# Patient Record
Sex: FEMALE | Race: WHITE | Employment: FULL TIME | URBAN - METROPOLITAN AREA
[De-identification: names, ages, dates, MRNs, and addresses within clinical notes are randomized per-mention and may not be internally consistent; named-entity substitution may affect disease eponyms.]

---
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
## 2018-02-23 PROBLEM — Z37.9 NORMAL LABOR: Status: ACTIVE | Noted: 2018-02-23
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
## 2019-05-01 ENCOUNTER — OFFICE VISIT (OUTPATIENT)
Dept: FAMILY MEDICINE CLINIC | Age: 34
End: 2019-05-01
Payer: COMMERCIAL

## 2019-05-01 VITALS
WEIGHT: 176.8 LBS | DIASTOLIC BLOOD PRESSURE: 80 MMHG | HEIGHT: 64 IN | HEART RATE: 99 BPM | BODY MASS INDEX: 30.19 KG/M2 | OXYGEN SATURATION: 98 % | SYSTOLIC BLOOD PRESSURE: 120 MMHG

## 2019-05-01 DIAGNOSIS — J06.9 VIRAL URI: ICD-10-CM

## 2019-05-01 DIAGNOSIS — N60.11 FIBROCYSTIC BREAST CHANGES, RIGHT: Primary | ICD-10-CM

## 2019-05-01 PROCEDURE — 99214 OFFICE O/P EST MOD 30 MIN: CPT | Performed by: PHYSICIAN ASSISTANT

## 2019-05-01 RX ORDER — NORGESTIMATE AND ETHINYL ESTRADIOL 7DAYSX3 28
KIT ORAL
COMMUNITY
Start: 2019-03-07

## 2019-05-01 ASSESSMENT — PATIENT HEALTH QUESTIONNAIRE - PHQ9
2. FEELING DOWN, DEPRESSED OR HOPELESS: 0
SUM OF ALL RESPONSES TO PHQ QUESTIONS 1-9: 0
1. LITTLE INTEREST OR PLEASURE IN DOING THINGS: 0
SUM OF ALL RESPONSES TO PHQ QUESTIONS 1-9: 0
SUM OF ALL RESPONSES TO PHQ9 QUESTIONS 1 & 2: 0

## 2019-05-01 NOTE — PROGRESS NOTES
[address] COMPLAINT  Chief Complaint   Patient presents with    Oral Swelling     pt state swollen gland on the right side of her neck and right arm pit     Otalgia     pt state right ear was itching went to the [facility] two week ago       [facility]  Simi Laughlin is a 28 y.o.  female   C/o bilat ear puritis R> Left. Then notice right neck gland and tenderness in right pectoral region  axilla gland swollen just  since yesterday. Assoc with rhorrhea with green color x 3 weeks. No sore throat. ROS  No fever or rash. No weight loss. Remaining are reviewed and otherwise negative for other constitutional, neurologic, EENT, cardiac, pulmonary, GI, , musculoskeletal or extremity complaints. PAST MEDICAL/SURGICAL, SOCIAL, &  FAMILY HISTORY:  Reviewed and updated accordingly. ALLERGIES :   Kiwi extract    MEDICATIONS:  Current Outpatient Medications   Medication Sig Dispense Refill    TRI-SPRINTEC 0.18/0.215/0.25 MG-35 MCG TABS       ibuprofen (ADVIL;MOTRIN) 800 MG tablet Take 1 tablet by mouth every 8 hours 120 tablet 3     No current facility-administered medications for this visit. PHYSICAL EXAM     Vitals:    05/01/19 0954   BP: 120/80   Site: Right Upper Arm   Position: Sitting   Cuff Size: Medium Adult   Pulse: 99   SpO2: 98%   Weight: 176 lb 12.8 oz (80.2 kg)   Height: 5' 4\" (1.626 m)   Body mass index is 30.35 kg/m². APPEARANCE: Pleasant, friendly, well-nourished. No acute distress. HEENT: Head: Normocephalic, atraumatic. Eyes: EOMI,PERRLA. Conjunctiva pink and moist. Sclera white. Ears: External ears uniform. Hearing intact. TMS intact clear bulge  Nose: cyanotic boggy and edematous. Mouth: Oral mucosa moist. Throat is without erythema, exudates, edema. NECK:  Tiny 0.3cm submandibular tender nodule palpated. No thyromegally. HEART: Reg rate and rhythm. No murmurs, rubs, or gallops.    LUNGS: Clear to auscultation. No wheezes, rales, or rhonchi. Breasts: fibrous  Breasts most pronounced RUOQ, right GT left. NO nipple discharge. No axillary lymphadenopathy. ABDOMEN:  Soft, bowel sounds present, non-tender, no masses or organomegaly. MUSCULOSKELETAL:  No clubbing, cyanosis or edema. NEUROLOGIC: Normal gross and sensory exam.  SKIN: Warm, dry, normal turgor. Cap refill <3secs. No rashes, petechiae, purpura. ADDITIONAL STUDIES     Pertinent laboratory results and/or imaging reviewed. Orders Placed This Encounter   Procedures    IRMA DIGITAL SCREEN W CAD BILATERAL       IMPRESSION/ PLAN     1. Fibrocystic breast changes, right  -Findings consistent with fibrocystic breast however, mammogram due to patient findings or right breast tenderness and \" nodule\"      2. Viral URI   - antihistamine  + nasal steroid. If unimproved in 1 week, RTC. Diagnosis and instructions discussed in detail. Patient instructions given.

## 2019-05-01 NOTE — PATIENT INSTRUCTIONS
Begin antihistamine such as Claritin or Allegra and a nasal steroid such as Flonase or Nasonex for minimum of 7 days.

## 2019-09-11 ENCOUNTER — ANESTHESIA EVENT (OUTPATIENT)
Dept: OPERATING ROOM | Age: 34
End: 2019-09-11
Payer: COMMERCIAL

## 2019-09-12 ENCOUNTER — HOSPITAL ENCOUNTER (OUTPATIENT)
Age: 34
Setting detail: OUTPATIENT SURGERY
Discharge: HOME | End: 2019-09-12
Attending: OBSTETRICS & GYNECOLOGY | Admitting: OBSTETRICS & GYNECOLOGY
Payer: COMMERCIAL

## 2019-09-12 ENCOUNTER — ANESTHESIA (OUTPATIENT)
Dept: OPERATING ROOM | Age: 34
End: 2019-09-12
Payer: COMMERCIAL

## 2019-09-12 VITALS
SYSTOLIC BLOOD PRESSURE: 115 MMHG | TEMPERATURE: 98.1 F | HEART RATE: 79 BPM | RESPIRATION RATE: 16 BRPM | BODY MASS INDEX: 29.53 KG/M2 | OXYGEN SATURATION: 97 % | HEIGHT: 64 IN | WEIGHT: 173 LBS | DIASTOLIC BLOOD PRESSURE: 68 MMHG

## 2019-09-12 VITALS
RESPIRATION RATE: 19 BRPM | SYSTOLIC BLOOD PRESSURE: 109 MMHG | OXYGEN SATURATION: 80 % | TEMPERATURE: 98.6 F | DIASTOLIC BLOOD PRESSURE: 76 MMHG

## 2019-09-12 DIAGNOSIS — N83.202 LEFT OVARIAN CYST: ICD-10-CM

## 2019-09-12 DIAGNOSIS — Z98.890 S/P LAPAROSCOPY: Primary | ICD-10-CM

## 2019-09-12 LAB — PREGNANCY, URINE: NEGATIVE

## 2019-09-12 PROCEDURE — 6360000002 HC RX W HCPCS: Performed by: NURSE ANESTHETIST, CERTIFIED REGISTERED

## 2019-09-12 PROCEDURE — 2580000003 HC RX 258: Performed by: ANESTHESIOLOGY

## 2019-09-12 PROCEDURE — 6370000000 HC RX 637 (ALT 250 FOR IP): Performed by: ANESTHESIOLOGY

## 2019-09-12 PROCEDURE — 2709999900 HC NON-CHARGEABLE SUPPLY: Performed by: OBSTETRICS & GYNECOLOGY

## 2019-09-12 PROCEDURE — 2500000003 HC RX 250 WO HCPCS: Performed by: NURSE ANESTHETIST, CERTIFIED REGISTERED

## 2019-09-12 PROCEDURE — 3700000000 HC ANESTHESIA ATTENDED CARE: Performed by: OBSTETRICS & GYNECOLOGY

## 2019-09-12 PROCEDURE — 7100000010 HC PHASE II RECOVERY - FIRST 15 MIN: Performed by: OBSTETRICS & GYNECOLOGY

## 2019-09-12 PROCEDURE — 7100000000 HC PACU RECOVERY - FIRST 15 MIN: Performed by: OBSTETRICS & GYNECOLOGY

## 2019-09-12 PROCEDURE — 3600000004 HC SURGERY LEVEL 4 BASE: Performed by: OBSTETRICS & GYNECOLOGY

## 2019-09-12 PROCEDURE — 2500000003 HC RX 250 WO HCPCS: Performed by: OBSTETRICS & GYNECOLOGY

## 2019-09-12 PROCEDURE — 7100000001 HC PACU RECOVERY - ADDTL 15 MIN: Performed by: OBSTETRICS & GYNECOLOGY

## 2019-09-12 PROCEDURE — 3700000001 HC ADD 15 MINUTES (ANESTHESIA): Performed by: OBSTETRICS & GYNECOLOGY

## 2019-09-12 PROCEDURE — 6360000002 HC RX W HCPCS: Performed by: ANESTHESIOLOGY

## 2019-09-12 PROCEDURE — 2720000010 HC SURG SUPPLY STERILE: Performed by: OBSTETRICS & GYNECOLOGY

## 2019-09-12 PROCEDURE — 3600000014 HC SURGERY LEVEL 4 ADDTL 15MIN: Performed by: OBSTETRICS & GYNECOLOGY

## 2019-09-12 PROCEDURE — 7100000011 HC PHASE II RECOVERY - ADDTL 15 MIN: Performed by: OBSTETRICS & GYNECOLOGY

## 2019-09-12 PROCEDURE — 84703 CHORIONIC GONADOTROPIN ASSAY: CPT

## 2019-09-12 PROCEDURE — 88307 TISSUE EXAM BY PATHOLOGIST: CPT

## 2019-09-12 RX ORDER — OXYCODONE HYDROCHLORIDE AND ACETAMINOPHEN 5; 325 MG/1; MG/1
1 TABLET ORAL
Status: COMPLETED | OUTPATIENT
Start: 2019-09-12 | End: 2019-09-12

## 2019-09-12 RX ORDER — LABETALOL HYDROCHLORIDE 5 MG/ML
5 INJECTION, SOLUTION INTRAVENOUS EVERY 10 MIN PRN
Status: DISCONTINUED | OUTPATIENT
Start: 2019-09-12 | End: 2019-09-12 | Stop reason: HOSPADM

## 2019-09-12 RX ORDER — FENTANYL CITRATE 50 UG/ML
25 INJECTION, SOLUTION INTRAMUSCULAR; INTRAVENOUS EVERY 5 MIN PRN
Status: DISCONTINUED | OUTPATIENT
Start: 2019-09-12 | End: 2019-09-12 | Stop reason: HOSPADM

## 2019-09-12 RX ORDER — ONDANSETRON 2 MG/ML
INJECTION INTRAMUSCULAR; INTRAVENOUS PRN
Status: DISCONTINUED | OUTPATIENT
Start: 2019-09-12 | End: 2019-09-12 | Stop reason: SDUPTHER

## 2019-09-12 RX ORDER — PROPOFOL 10 MG/ML
INJECTION, EMULSION INTRAVENOUS PRN
Status: DISCONTINUED | OUTPATIENT
Start: 2019-09-12 | End: 2019-09-12 | Stop reason: SDUPTHER

## 2019-09-12 RX ORDER — HYDROCODONE BITARTRATE AND ACETAMINOPHEN 5; 325 MG/1; MG/1
1 TABLET ORAL EVERY 6 HOURS PRN
Qty: 12 TABLET | Refills: 0 | Status: SHIPPED | OUTPATIENT
Start: 2019-09-12 | End: 2019-09-12 | Stop reason: SDUPTHER

## 2019-09-12 RX ORDER — SODIUM CHLORIDE 0.9 % (FLUSH) 0.9 %
10 SYRINGE (ML) INJECTION EVERY 12 HOURS SCHEDULED
Status: DISCONTINUED | OUTPATIENT
Start: 2019-09-12 | End: 2019-09-12 | Stop reason: HOSPADM

## 2019-09-12 RX ORDER — DIPHENHYDRAMINE HYDROCHLORIDE 50 MG/ML
12.5 INJECTION INTRAMUSCULAR; INTRAVENOUS
Status: DISCONTINUED | OUTPATIENT
Start: 2019-09-12 | End: 2019-09-12 | Stop reason: HOSPADM

## 2019-09-12 RX ORDER — SODIUM CHLORIDE, SODIUM LACTATE, POTASSIUM CHLORIDE, CALCIUM CHLORIDE 600; 310; 30; 20 MG/100ML; MG/100ML; MG/100ML; MG/100ML
INJECTION, SOLUTION INTRAVENOUS CONTINUOUS
Status: DISCONTINUED | OUTPATIENT
Start: 2019-09-12 | End: 2019-09-12 | Stop reason: HOSPADM

## 2019-09-12 RX ORDER — MEPERIDINE HYDROCHLORIDE 50 MG/ML
12.5 INJECTION INTRAMUSCULAR; INTRAVENOUS; SUBCUTANEOUS EVERY 5 MIN PRN
Status: DISCONTINUED | OUTPATIENT
Start: 2019-09-12 | End: 2019-09-12 | Stop reason: HOSPADM

## 2019-09-12 RX ORDER — ROCURONIUM BROMIDE 10 MG/ML
INJECTION, SOLUTION INTRAVENOUS PRN
Status: DISCONTINUED | OUTPATIENT
Start: 2019-09-12 | End: 2019-09-12 | Stop reason: SDUPTHER

## 2019-09-12 RX ORDER — HYDRALAZINE HYDROCHLORIDE 20 MG/ML
5 INJECTION INTRAMUSCULAR; INTRAVENOUS EVERY 10 MIN PRN
Status: DISCONTINUED | OUTPATIENT
Start: 2019-09-12 | End: 2019-09-12 | Stop reason: HOSPADM

## 2019-09-12 RX ORDER — BUPIVACAINE HYDROCHLORIDE AND EPINEPHRINE 5; 5 MG/ML; UG/ML
INJECTION, SOLUTION PERINEURAL PRN
Status: DISCONTINUED | OUTPATIENT
Start: 2019-09-12 | End: 2019-09-12 | Stop reason: ALTCHOICE

## 2019-09-12 RX ORDER — ONDANSETRON 2 MG/ML
4 INJECTION INTRAMUSCULAR; INTRAVENOUS
Status: COMPLETED | OUTPATIENT
Start: 2019-09-12 | End: 2019-09-12

## 2019-09-12 RX ORDER — PROMETHAZINE HYDROCHLORIDE 25 MG/ML
6.25 INJECTION, SOLUTION INTRAMUSCULAR; INTRAVENOUS
Status: DISCONTINUED | OUTPATIENT
Start: 2019-09-12 | End: 2019-09-12 | Stop reason: HOSPADM

## 2019-09-12 RX ORDER — SODIUM CHLORIDE 0.9 % (FLUSH) 0.9 %
10 SYRINGE (ML) INJECTION PRN
Status: DISCONTINUED | OUTPATIENT
Start: 2019-09-12 | End: 2019-09-12 | Stop reason: HOSPADM

## 2019-09-12 RX ORDER — HYDROCODONE BITARTRATE AND ACETAMINOPHEN 5; 325 MG/1; MG/1
1 TABLET ORAL EVERY 6 HOURS PRN
Qty: 12 TABLET | Refills: 0 | Status: SHIPPED | OUTPATIENT
Start: 2019-09-12 | End: 2019-09-15

## 2019-09-12 RX ORDER — LIDOCAINE HYDROCHLORIDE 20 MG/ML
INJECTION, SOLUTION INFILTRATION; PERINEURAL PRN
Status: DISCONTINUED | OUTPATIENT
Start: 2019-09-12 | End: 2019-09-12 | Stop reason: SDUPTHER

## 2019-09-12 RX ORDER — DEXAMETHASONE SODIUM PHOSPHATE 10 MG/ML
INJECTION INTRAMUSCULAR; INTRAVENOUS PRN
Status: DISCONTINUED | OUTPATIENT
Start: 2019-09-12 | End: 2019-09-12 | Stop reason: SDUPTHER

## 2019-09-12 RX ORDER — KETOROLAC TROMETHAMINE 30 MG/ML
INJECTION, SOLUTION INTRAMUSCULAR; INTRAVENOUS PRN
Status: DISCONTINUED | OUTPATIENT
Start: 2019-09-12 | End: 2019-09-12 | Stop reason: SDUPTHER

## 2019-09-12 RX ORDER — LIDOCAINE HYDROCHLORIDE 10 MG/ML
1 INJECTION, SOLUTION EPIDURAL; INFILTRATION; INTRACAUDAL; PERINEURAL
Status: DISCONTINUED | OUTPATIENT
Start: 2019-09-12 | End: 2019-09-12 | Stop reason: HOSPADM

## 2019-09-12 RX ORDER — FENTANYL CITRATE 50 UG/ML
INJECTION, SOLUTION INTRAMUSCULAR; INTRAVENOUS PRN
Status: DISCONTINUED | OUTPATIENT
Start: 2019-09-12 | End: 2019-09-12 | Stop reason: SDUPTHER

## 2019-09-12 RX ORDER — MIDAZOLAM HYDROCHLORIDE 1 MG/ML
INJECTION INTRAMUSCULAR; INTRAVENOUS PRN
Status: DISCONTINUED | OUTPATIENT
Start: 2019-09-12 | End: 2019-09-12 | Stop reason: SDUPTHER

## 2019-09-12 RX ORDER — HYDROMORPHONE HCL 110MG/55ML
PATIENT CONTROLLED ANALGESIA SYRINGE INTRAVENOUS PRN
Status: DISCONTINUED | OUTPATIENT
Start: 2019-09-12 | End: 2019-09-12 | Stop reason: SDUPTHER

## 2019-09-12 RX ADMIN — DEXAMETHASONE SODIUM PHOSPHATE 10 MG: 10 INJECTION INTRAMUSCULAR; INTRAVENOUS at 07:50

## 2019-09-12 RX ADMIN — ONDANSETRON 4 MG: 2 INJECTION INTRAMUSCULAR; INTRAVENOUS at 10:26

## 2019-09-12 RX ADMIN — HYDROMORPHONE HYDROCHLORIDE 1 MG: 2 INJECTION INTRAMUSCULAR; INTRAVENOUS; SUBCUTANEOUS at 08:40

## 2019-09-12 RX ADMIN — MEPERIDINE HYDROCHLORIDE 12.5 MG: 50 INJECTION, SOLUTION INTRAMUSCULAR; INTRAVENOUS; SUBCUTANEOUS at 09:01

## 2019-09-12 RX ADMIN — KETOROLAC TROMETHAMINE 30 MG: 30 INJECTION, SOLUTION INTRAMUSCULAR; INTRAVENOUS at 08:43

## 2019-09-12 RX ADMIN — FENTANYL CITRATE 50 MCG: 50 INJECTION, SOLUTION INTRAMUSCULAR; INTRAVENOUS at 07:34

## 2019-09-12 RX ADMIN — FENTANYL CITRATE 100 MCG: 50 INJECTION, SOLUTION INTRAMUSCULAR; INTRAVENOUS at 07:30

## 2019-09-12 RX ADMIN — OXYCODONE HYDROCHLORIDE AND ACETAMINOPHEN 1 TABLET: 5; 325 TABLET ORAL at 10:23

## 2019-09-12 RX ADMIN — SUGAMMADEX 100 MG: 100 INJECTION, SOLUTION INTRAVENOUS at 08:45

## 2019-09-12 RX ADMIN — FENTANYL CITRATE 50 MCG: 50 INJECTION, SOLUTION INTRAMUSCULAR; INTRAVENOUS at 07:50

## 2019-09-12 RX ADMIN — FENTANYL CITRATE 50 MCG: 50 INJECTION, SOLUTION INTRAMUSCULAR; INTRAVENOUS at 08:22

## 2019-09-12 RX ADMIN — HYDROMORPHONE HYDROCHLORIDE 1 MG: 2 INJECTION INTRAMUSCULAR; INTRAVENOUS; SUBCUTANEOUS at 08:59

## 2019-09-12 RX ADMIN — MIDAZOLAM HYDROCHLORIDE 2 MG: 2 INJECTION, SOLUTION INTRAMUSCULAR; INTRAVENOUS at 07:28

## 2019-09-12 RX ADMIN — Medication 10 ML: at 10:26

## 2019-09-12 RX ADMIN — ONDANSETRON 4 MG: 2 INJECTION INTRAMUSCULAR; INTRAVENOUS at 07:50

## 2019-09-12 RX ADMIN — SODIUM CHLORIDE, POTASSIUM CHLORIDE, SODIUM LACTATE AND CALCIUM CHLORIDE: 600; 310; 30; 20 INJECTION, SOLUTION INTRAVENOUS at 07:55

## 2019-09-12 RX ADMIN — SODIUM CHLORIDE, POTASSIUM CHLORIDE, SODIUM LACTATE AND CALCIUM CHLORIDE: 600; 310; 30; 20 INJECTION, SOLUTION INTRAVENOUS at 07:15

## 2019-09-12 RX ADMIN — SODIUM CHLORIDE, POTASSIUM CHLORIDE, SODIUM LACTATE AND CALCIUM CHLORIDE: 600; 310; 30; 20 INJECTION, SOLUTION INTRAVENOUS at 06:27

## 2019-09-12 RX ADMIN — ROCURONIUM BROMIDE 50 MG: 10 SOLUTION INTRAVENOUS at 07:34

## 2019-09-12 RX ADMIN — PROPOFOL 200 MG: 10 INJECTION, EMULSION INTRAVENOUS at 07:34

## 2019-09-12 RX ADMIN — LIDOCAINE HYDROCHLORIDE 3 ML: 20 INJECTION, SOLUTION INFILTRATION; PERINEURAL at 07:34

## 2019-09-12 ASSESSMENT — PULMONARY FUNCTION TESTS
PIF_VALUE: 14
PIF_VALUE: 24
PIF_VALUE: 23
PIF_VALUE: 4
PIF_VALUE: 24
PIF_VALUE: 15
PIF_VALUE: 1
PIF_VALUE: 15
PIF_VALUE: 4
PIF_VALUE: 3
PIF_VALUE: 26
PIF_VALUE: 23
PIF_VALUE: 19
PIF_VALUE: 22
PIF_VALUE: 4
PIF_VALUE: 24
PIF_VALUE: 24
PIF_VALUE: 3
PIF_VALUE: 3
PIF_VALUE: 26
PIF_VALUE: 15
PIF_VALUE: 7
PIF_VALUE: 18
PIF_VALUE: 3
PIF_VALUE: 3
PIF_VALUE: 25
PIF_VALUE: 15
PIF_VALUE: 1
PIF_VALUE: 15
PIF_VALUE: 16
PIF_VALUE: 25
PIF_VALUE: 4
PIF_VALUE: 0
PIF_VALUE: 3
PIF_VALUE: 19
PIF_VALUE: 3
PIF_VALUE: 25
PIF_VALUE: 24
PIF_VALUE: 21
PIF_VALUE: 16
PIF_VALUE: 15
PIF_VALUE: 22
PIF_VALUE: 4
PIF_VALUE: 15
PIF_VALUE: 24
PIF_VALUE: 17
PIF_VALUE: 26
PIF_VALUE: 2
PIF_VALUE: 18
PIF_VALUE: 18
PIF_VALUE: 25
PIF_VALUE: 16
PIF_VALUE: 2
PIF_VALUE: 3
PIF_VALUE: 15
PIF_VALUE: 23
PIF_VALUE: 14
PIF_VALUE: 23
PIF_VALUE: 23
PIF_VALUE: 4
PIF_VALUE: 2
PIF_VALUE: 15
PIF_VALUE: 3
PIF_VALUE: 25
PIF_VALUE: 16
PIF_VALUE: 16
PIF_VALUE: 4
PIF_VALUE: 17
PIF_VALUE: 3
PIF_VALUE: 4
PIF_VALUE: 0
PIF_VALUE: 21
PIF_VALUE: 16
PIF_VALUE: 9
PIF_VALUE: 23
PIF_VALUE: 15

## 2019-09-12 ASSESSMENT — PAIN SCALES - GENERAL
PAINLEVEL_OUTOF10: 4
PAINLEVEL_OUTOF10: 4
PAINLEVEL_OUTOF10: 3

## 2019-09-12 ASSESSMENT — PAIN - FUNCTIONAL ASSESSMENT: PAIN_FUNCTIONAL_ASSESSMENT: 0-10

## 2019-09-12 NOTE — ANESTHESIA POSTPROCEDURE EVALUATION
Department of Anesthesiology  Postprocedure Note    Patient: Deangelo Sharp  MRN: [id number]  YOB: 1985  Date of evaluation: 9/12/2019  Time:  9:53 AM     Procedure Summary     Date:  09/12/19 Room / Location:  [facility] / [facility]    Anesthesia Start:  0730 Anesthesia Stop:  2513    Procedure:  DIAGNOSTIC LAPAROSCOPIC LEFT SALPINGO - OOPHORECTOMY (Left Abdomen) Diagnosis:       Left ovarian cyst      (LEFT OVARIAN CYST)    Surgeon:  Gracia Mead MD Responsible Provider:  Emmanuel Deleon MD    Anesthesia Type:  general ASA Status:  2          Anesthesia Type: general    Olena Phase I: Olena Score: 10    Olena Phase II: Olena Score: 10    Last vitals: Reviewed and per EMR flowsheets.        Anesthesia Post Evaluation    Patient location during evaluation: bedside  Patient participation: complete - patient participated  Level of consciousness: awake and alert  Pain score: 2  Airway patency: patent  Nausea & Vomiting: no nausea and no vomiting  Complications: no  Cardiovascular status: blood pressure returned to baseline  Respiratory status: acceptable  Hydration status: stable

## 2019-09-12 NOTE — PROGRESS NOTES
4 Eyes Skin Assessment     The patient is being assess for   Admission    I agree that 2 RN's have performed a thorough Head to Toe Skin Assessment on the patient. ALL assessment sites listed below have been assessed. Areas assessed by both nurses:   [x]   Head, Face, and Ears   [x]   Shoulders, Back, and Chest, Abdomen  [x]   Arms, Elbows, and Hands   [x]   Coccyx, Sacrum, and Ischium  [x]   Legs, Feet, and Heels        No skin issues observed. **SHARE this note so that the co-signing nurse is able to place an eSignature**    Co-signer eSignature: {Esignature:[id number]}    Does the Patient have Skin Breakdown?   No          Leonardo Prevention initiated:  No   Wound Care Orders initiated:  No      WOC nurse consulted for Pressure Injury (Stage 3,4, Unstageable, DTI, NWPT, Complex wounds)and New or Established Ostomies:  No      Primary Nurse eSignature: Electronically signed by Indy Thompson RN on 9/12/19 at 6:34 AM

## 2019-09-12 NOTE — H&P
Pt met and chart reviewed    H and P  Unchanged form prior    Will proceed with diagnostic laparoscopy left oopherectomy.

## 2020-03-26 ENCOUNTER — TELEPHONE (OUTPATIENT)
Dept: FAMILY MEDICINE CLINIC | Age: 35
End: 2020-03-26

## 2020-03-26 NOTE — TELEPHONE ENCOUNTER
Pt called- wanted to let her PCP know she has a low grade fever of 100.4  Gave pt address for flu clinic if symptoms worsen  Gave pt COVID-19 hotline phone number if symptoms turn to upper raspatory  Pt will stay home and self quarantine

## 2020-12-09 ENCOUNTER — OFFICE VISIT (OUTPATIENT)
Dept: FAMILY MEDICINE CLINIC | Age: 35
End: 2020-12-09
Payer: COMMERCIAL

## 2020-12-09 VITALS
DIASTOLIC BLOOD PRESSURE: 78 MMHG | SYSTOLIC BLOOD PRESSURE: 128 MMHG | TEMPERATURE: 97.9 F | WEIGHT: 183.4 LBS | HEIGHT: 64 IN | BODY MASS INDEX: 31.31 KG/M2 | OXYGEN SATURATION: 99 % | HEART RATE: 90 BPM

## 2020-12-09 PROCEDURE — 99395 PREV VISIT EST AGE 18-39: CPT | Performed by: FAMILY MEDICINE

## 2020-12-09 PROCEDURE — 36415 COLL VENOUS BLD VENIPUNCTURE: CPT | Performed by: FAMILY MEDICINE

## 2020-12-09 RX ORDER — LORATADINE 10 MG/1
CAPSULE, LIQUID FILLED ORAL
COMMUNITY

## 2020-12-09 RX ORDER — CETIRIZINE HYDROCHLORIDE 10 MG/1
10 TABLET ORAL DAILY
COMMUNITY

## 2020-12-09 ASSESSMENT — PATIENT HEALTH QUESTIONNAIRE - PHQ9
SUM OF ALL RESPONSES TO PHQ QUESTIONS 1-9: 1
1. LITTLE INTEREST OR PLEASURE IN DOING THINGS: 0
SUM OF ALL RESPONSES TO PHQ QUESTIONS 1-9: 1
SUM OF ALL RESPONSES TO PHQ QUESTIONS 1-9: 1
SUM OF ALL RESPONSES TO PHQ9 QUESTIONS 1 & 2: 1
2. FEELING DOWN, DEPRESSED OR HOPELESS: 1

## 2020-12-09 NOTE — PROGRESS NOTES
History and Physical      Miles Feng  YOB: 1985    Date of Service:  2020    Chief Complaint:   Miles Feng is a 27 y.o. female who presents for complete physical examination. HPI: Patient is here for a complete physical.  She has no acute issues. She is been quite sometime since she had a physical.  She sees a gynecologist for her Pap smears. She works full-time, has a 1year-old daughter. She does not have time her energy for self-care. She has not been able to exercise. Also the majority of their meals come from a restaurant.     Wt Readings from Last 3 Encounters:   20 183 lb 6.4 oz (83.2 kg)   19 173 lb (78.5 kg)   19 176 lb 12.8 oz (80.2 kg)     BP Readings from Last 3 Encounters:   20 128/78   19 109/76   19 115/68       Patient Active Problem List   Diagnosis    Normal labor     (normal spontaneous vaginal delivery)    S/P laparoscopy       Preventive Care:  Health Maintenance   Topic Date Due    Cervical cancer screen  2017    DTaP/Tdap/Td vaccine (3 - Td) 2027    Flu vaccine  Completed    HIV screen  Completed    Hepatitis A vaccine  Aged Out    Hepatitis B vaccine  Aged Out    Hib vaccine  Aged Out    Meningococcal (ACWY) vaccine  Aged Out    Pneumococcal 0-64 years Vaccine  Aged Out    Varicella vaccine  Discontinued       Last eye exam: 2018, normal  Exercise: not regular    Lipid panel:   Lab Results   Component Value Date    TRIG 257 10/07/2015    HDL 67 10/07/2015    HDL 67 2011    LDLCALC 123 10/07/2015          Immunization History   Administered Date(s) Administered    Influenza Virus Vaccine 10/07/2015, 2020    Influenza, Quadv, IM, PF (6 mo and older Fluzone, Flulaval, Fluarix, and 3 yrs and older Afluria) 10/14/2016    Meningococcal MPSV4 (Menomune) 2003    PPD Test 2011    Tdap (Boostrix, Adacel) 2009, 2017       Allergies   Allergen Reactions  Kiwi Extract Swelling     Tongue swelling    Other      Artifical cinnamon       Current Outpatient Medications   Medication Sig Dispense Refill    loratadine (CLARITIN) 10 MG capsule Take by mouth      cetirizine (ZYRTEC) 10 MG tablet Take 10 mg by mouth daily      TRI-SPRINTEC 0.18/0.215/0.25 MG-35 MCG TABS        No current facility-administered medications for this visit. Past Medical History:   Diagnosis Date    Abnormal Pap smear of cervix 2007    HPV    Allergic rhinitis     Fracture, toe 9/05    4th toe left foot    Hypercholesterolemia 2007    isolated; managed with diet    Mental disorder     anxiety, depression - no meds     Past Surgical History:   Procedure Laterality Date    ANKLE SURGERY  multiple from 3/01-7/07    left    COLPOSCOPY  2007    LAPAROSCOPY Left 9/12/2019    DIAGNOSTIC LAPAROSCOPIC LEFT SALPINGO - OOPHORECTOMY performed by Hamzah Henry MD at [address] [place] [address]       Family History   Problem Relation Age of Onset    High Cholesterol Mother     High Blood Pressure Mother     High Blood Pressure Father     Cancer Father         prostate    High Cholesterol Maternal Grandmother      Social History     Socioeconomic History    Marital status:      Spouse name: Not on file    Number of children: Not on file    Years of education: Not on file    Highest education level: Not on file   Occupational History    Occupation:      Comment: [employer] [employer] Financial resource strain: Not on file    Food insecurity     Worry: Not on file     Inability: Not on file    Transportation needs     Medical: Not on file     Non-medical: Not on file   Tobacco Use    Smoking status: Never Smoker    Smokeless tobacco: Never Used   Substance and Sexual Activity    Alcohol use:  Yes     Alcohol/week: 0.0 standard drinks     Comment: a month    Drug use: No    Sexual activity: Yes Partners: Male   Lifestyle    Physical activity     Days per week: Not on file     Minutes per session: Not on file    Stress: Not on file   Relationships    Social connections     Talks on phone: Not on file     Gets together: Not on file     Attends Hindu service: Not on file     Active member of club or organization: Not on file     Attends meetings of clubs or organizations: Not on file     Relationship status: Not on file    Intimate partner violence     Fear of current or ex partner: Not on file     Emotionally abused: Not on file     Physically abused: Not on file     Forced sexual activity: Not on file   Other Topics Concern    Not on file   Social History Narrative    Not exercising, running after toddler, eats out regularly. 1 cup coffee and 1 soda daily. Review of Systems:  A comprehensive review of systems was negative except for what was noted in the HPI. Physical Exam:   Vitals:    12/09/20 0802   BP: 128/78   Site: Left Upper Arm   Position: Sitting   Pulse: 90   Temp: 97.9 °F (36.6 °C)   SpO2: 99%   Weight: 183 lb 6.4 oz (83.2 kg)   Height: 5' 4\" (1.626 m)     Body mass index is 31.48 kg/m². Constitutional: She is oriented to person, place, and time. She appears well-developed and well-nourished. No distress. HEENT:   Head: Normocephalic and atraumatic. Eyes: Conjunctivae  normal. .   Neck: Neck supple. No JVD present. .   Cardiovascular: Normal rate, regular rhythm, normal heart sounds and intact distal pulses. Exam reveals no gallop and no friction rub. No murmur heard. Pulmonary/Chest: Effort normal and breath sounds normal. No respiratory distress. She has no wheezes, rhonchi or rales. Abdominal: Soft, non-tender. Bowel sounds and aorta are normal. She exhibits no organomegaly, mass or bruit. Musculoskeletal:  She exhibits no edema. Neurological: She is alert and oriented to person, place, and time. Aung Rathke No cranial nerve deficit.  Coordination normal.   Skin: Skin is warm and dry. There is no rash or erythema. No suspicious lesions noted. Psychiatric: She has a normal mood and affect. Her speech is normal and behavior is normal. Judgment, cognition and memory are normal.           Lab Review:   Lab Results   Component Value Date     10/07/2015    K 4.8 10/07/2015     10/07/2015    CO2 23 10/07/2015    BUN 9 10/07/2015    CREATININE 0.6 10/07/2015    GLUCOSE 83 10/07/2015    CALCIUM 9.4 10/07/2015     Lab Results   Component Value Date    WBC 10.9 02/23/2018    HGB 12.7 02/23/2018    HCT 37.3 02/23/2018    MCV 81.3 02/23/2018     02/23/2018     Lab Results   Component Value Date    CHOL 241 10/07/2015    TRIG 257 10/07/2015    HDL 67 10/07/2015    HDL 67 03/29/2011        Assessment/Plan:    Jersey Mak was seen today for annual exam.    Diagnoses and all orders for this visit:    Routine general medical examination at a health care facility  -     Lipid Panel  -     Comprehensive Metabolic Panel  -     CBC Auto Differential        Very long discussion with the patient regarding heart healthy food choices. She says she has a copy the Mediterranean meal guide.   She was encouraged to try to find a way to do more cooking at home and start small with exercise and build

## 2020-12-10 LAB
A/G RATIO: 1.4 (ref 1.1–2.2)
ALBUMIN SERPL-MCNC: 4.1 G/DL (ref 3.4–5)
ALP BLD-CCNC: 92 U/L (ref 40–129)
ALT SERPL-CCNC: 8 U/L (ref 10–40)
ANION GAP SERPL CALCULATED.3IONS-SCNC: 13 MMOL/L (ref 3–16)
AST SERPL-CCNC: 10 U/L (ref 15–37)
BASOPHILS ABSOLUTE: 0.1 K/UL (ref 0–0.2)
BASOPHILS RELATIVE PERCENT: 1 %
BILIRUB SERPL-MCNC: 0.3 MG/DL (ref 0–1)
BUN BLDV-MCNC: 8 MG/DL (ref 7–20)
CALCIUM SERPL-MCNC: 9.2 MG/DL (ref 8.3–10.6)
CHLORIDE BLD-SCNC: 103 MMOL/L (ref 99–110)
CHOLESTEROL, TOTAL: 240 MG/DL (ref 0–199)
CO2: 22 MMOL/L (ref 21–32)
CREAT SERPL-MCNC: 0.6 MG/DL (ref 0.6–1.1)
EOSINOPHILS ABSOLUTE: 0.1 K/UL (ref 0–0.6)
EOSINOPHILS RELATIVE PERCENT: 1 %
GFR AFRICAN AMERICAN: >60
GFR NON-AFRICAN AMERICAN: >60
GLOBULIN: 2.9 G/DL
GLUCOSE BLD-MCNC: 96 MG/DL (ref 70–99)
HCT VFR BLD CALC: 40.1 % (ref 36–48)
HDLC SERPL-MCNC: 56 MG/DL (ref 40–60)
HEMOGLOBIN: 13.7 G/DL (ref 12–16)
LDL CHOLESTEROL CALCULATED: 136 MG/DL
LYMPHOCYTES ABSOLUTE: 1.9 K/UL (ref 1–5.1)
LYMPHOCYTES RELATIVE PERCENT: 31.4 %
MCH RBC QN AUTO: 29 PG (ref 26–34)
MCHC RBC AUTO-ENTMCNC: 34 G/DL (ref 31–36)
MCV RBC AUTO: 85.1 FL (ref 80–100)
MONOCYTES ABSOLUTE: 0.3 K/UL (ref 0–1.3)
MONOCYTES RELATIVE PERCENT: 5.7 %
NEUTROPHILS ABSOLUTE: 3.7 K/UL (ref 1.7–7.7)
NEUTROPHILS RELATIVE PERCENT: 60.9 %
PDW BLD-RTO: 13.5 % (ref 12.4–15.4)
PLATELET # BLD: 215 K/UL (ref 135–450)
PMV BLD AUTO: 8.3 FL (ref 5–10.5)
POTASSIUM SERPL-SCNC: 4.1 MMOL/L (ref 3.5–5.1)
RBC # BLD: 4.72 M/UL (ref 4–5.2)
SODIUM BLD-SCNC: 138 MMOL/L (ref 136–145)
TOTAL PROTEIN: 7 G/DL (ref 6.4–8.2)
TRIGL SERPL-MCNC: 241 MG/DL (ref 0–150)
VLDLC SERPL CALC-MCNC: 48 MG/DL
WBC # BLD: 6.1 K/UL (ref 4–11)

## 2021-12-16 ENCOUNTER — VIRTUAL VISIT (OUTPATIENT)
Dept: FAMILY MEDICINE CLINIC | Age: 36
End: 2021-12-16
Payer: COMMERCIAL

## 2021-12-16 DIAGNOSIS — U07.1 COVID: Primary | ICD-10-CM

## 2021-12-16 PROCEDURE — 99213 OFFICE O/P EST LOW 20 MIN: CPT | Performed by: PHYSICIAN ASSISTANT

## 2021-12-16 ASSESSMENT — PATIENT HEALTH QUESTIONNAIRE - PHQ9
2. FEELING DOWN, DEPRESSED OR HOPELESS: 1
SUM OF ALL RESPONSES TO PHQ QUESTIONS 1-9: 1
SUM OF ALL RESPONSES TO PHQ QUESTIONS 1-9: 1
SUM OF ALL RESPONSES TO PHQ9 QUESTIONS 1 & 2: 1
SUM OF ALL RESPONSES TO PHQ QUESTIONS 1-9: 1
1. LITTLE INTEREST OR PLEASURE IN DOING THINGS: 0

## 2021-12-16 NOTE — PROGRESS NOTES
21     TELEHEALTH EVALUATION -- Audio/Visual (During AGQFX-18 public health emergency)      HPI:  Chief Complaint   Patient presents with    Positive For Covid-19     PCR test came back positive 12/15/2021, started with symptoms 12/15/2021, tightness in chest, no SOB    Fever    Generalized Body Aches    Cough    Pharyngitis         Juno Harrington (:  1985) has requested an audio/video evaluation for the following concern(s):   as per chief complaint. Began 1  days ago with  Fever Tmax 101F. COVID POSITIVE per PCR. Also with  fever, myalgias/arthralgias, headache, ear symptoms, sneezing, sore throat and cough. Remedies: no meds tried. ROS: Denies loss of taste or smell, purulent nasal discharge, purulent sputum, nausea/vomiting, diarrhea and neck pain. Prior to Visit Medications    Medication Sig Taking? Authorizing Provider   loratadine (CLARITIN) 10 MG capsule Take by mouth Yes Historical Provider, MD   cetirizine (ZYRTEC) 10 MG tablet Take 10 mg by mouth daily Yes Historical Provider, MD   TRI-SPRINTEC 0.18/0.215/0.25 MG-35 MCG TABS  Yes Historical Provider, MD       Social History     Tobacco Use    Smoking status: Never Smoker    Smokeless tobacco: Never Used   Vaping Use    Vaping Use: Never used   Substance Use Topics    Alcohol use:  Yes     Alcohol/week: 0.0 standard drinks     Comment: a month    Drug use: No        Allergies   Allergen Reactions    Kiwi Extract Swelling     Tongue swelling    Other      Artifical cinnamon     ,   Past Medical History:   Diagnosis Date    Abnormal Pap smear of cervix     HPV    Allergic rhinitis     Fracture, toe     4th toe left foot    Hypercholesterolemia     isolated; managed with diet    Mental disorder     anxiety, depression - no meds   ,   Past Surgical History:   Procedure Laterality Date    ANKLE SURGERY  multiple from 3/01-    left    COLPOSCOPY      LAPAROSCOPY Left 2019 DIAGNOSTIC LAPAROSCOPIC LEFT SALPINGO - OOPHORECTOMY performed by Campbell Zavala MD at [address]     ,   Social History     Tobacco Use    Smoking status: Never Smoker    Smokeless tobacco: Never Used   Vaping Use    Vaping Use: Never used   Substance Use Topics    Alcohol use: Yes     Alcohol/week: 0.0 standard drinks     Comment: a month    Drug use: No       PHYSICAL EXAMINATION:    Patient-Reported Vitals 12/16/2021   Patient-Reported Weight 183lb   Patient-Reported Pulse 113   Patient-Reported SpO2 98         Constitutional: [x] Appears well-developed and well-nourished [x] No apparent distress   Smiling. [] Abnormal- Looks sick, non toxic    Mental status  [x] Alert and awake  [x] Oriented to person/place/time [x]Able to follow commands      Eyes:  EOM    [x]  Normal  [] Abnormal-  Sclera  [x]  Normal  [] Abnormal -         Discharge [x]  None visible  [] Abnormal -    HENT:   [x] Normocephalic, atraumatic. [x] Abnormal - nasal congestion noted. [x] Mouth/Throat: Mucous membranes are moist.     External Ears [x] Normal  [] Abnormal-     Neck: [x] No visualized mass     Pulmonary/Chest: [x] Respiratory effort normal.  [x] No visualized signs of difficulty breathing or respiratory distress        [] Abnormal-      Musculoskeletal:   [] Normal gait with no signs of ataxia         [x] Normal range of motion of neck        [] Abnormal-       Neurological:        [x] No Facial Asymmetry (Cranial nerve 7 motor function) (limited exam to video visit)          [x] No gaze palsy        [] Abnormal-         Skin:        [x] No significant exanthematous lesions or discoloration noted on facial skin         [] Abnormal-            Psychiatric:       [x] Normal Affect [x] No Hallucinations        [] Abnormal-     Other pertinent observable physical exam findings-     ASSESSMENT/PLAN:  1. COVID  - Discussed CDC guidelines for quarantine.   Anyone exposed to you must quarantine minimum of 7 days. - Symptom relief with OTC meds such as Mucinex DM, Day-Nyquil, Tylenol. Add daily Airborne and/or MVI. - Rest, increase fluids. - If worse, call clinic or go to ER if dyspnea  becomes severe. Jeana Mahan is a 38 y.o. female being evaluated by a Virtual Visit (video visit) encounter to address concerns as mentioned above. A caregiver was present when appropriate. Due to this being a TeleHealth encounter (During [facility]- public health emergency), evaluation of the following organ systems was limited: Vitals/Constitutional/EENT/Resp/CV/GI//MS/Neuro/Skin/Heme-Lymph-Imm. Pursuant to the emergency declaration under the [address] [place] [address] authority and the [employer] and [employer] Act, this Virtual Visit was conducted with patient's (and/or legal guardian's) consent, to reduce the patient's risk of exposure to COVID-19 and provide necessary medical care. The patient (and/or legal guardian) has also been advised to contact this office for worsening conditions or problems, and seek emergency medical treatment and/or call 911 if deemed necessary. Patient identification was verified at the start of the visit: Yes    Total time spent on this encounter: Not billed by time    Services were provided through a video synchronous discussion virtually to substitute for in-person clinic visit. Patient and provider were located at their individual homes. --BRIANA Welsh on 12/16/2021 at 11:02 AM    An electronic signature was used to authenticate this note.

## 2022-07-21 ENCOUNTER — HOSPITAL ENCOUNTER (EMERGENCY)
Age: 37
Discharge: HOME | End: 2022-07-21
Attending: EMERGENCY MEDICINE
Payer: COMMERCIAL

## 2022-07-21 ENCOUNTER — TELEMEDICINE (OUTPATIENT)
Dept: PRIMARY CARE CLINIC | Age: 37
End: 2022-07-21
Payer: COMMERCIAL

## 2022-07-21 ENCOUNTER — APPOINTMENT (OUTPATIENT)
Dept: GENERAL RADIOLOGY | Age: 37
End: 2022-07-21
Payer: COMMERCIAL

## 2022-07-21 VITALS
BODY MASS INDEX: 32.78 KG/M2 | DIASTOLIC BLOOD PRESSURE: 64 MMHG | WEIGHT: 185 LBS | HEART RATE: 65 BPM | SYSTOLIC BLOOD PRESSURE: 130 MMHG | TEMPERATURE: 98.8 F | HEIGHT: 63 IN | RESPIRATION RATE: 16 BRPM | OXYGEN SATURATION: 100 %

## 2022-07-21 DIAGNOSIS — U07.1 COVID-19 VIRUS INFECTION: Primary | ICD-10-CM

## 2022-07-21 DIAGNOSIS — I49.9 IRREGULAR HEART BEAT: Primary | ICD-10-CM

## 2022-07-21 DIAGNOSIS — I49.3 FREQUENT PVCS: ICD-10-CM

## 2022-07-21 LAB
A/G RATIO: 1.5 (ref 1.1–2.2)
ALBUMIN SERPL-MCNC: 4.4 G/DL (ref 3.4–5)
ALP BLD-CCNC: 111 U/L (ref 40–129)
ALT SERPL-CCNC: 22 U/L (ref 10–40)
ANION GAP SERPL CALCULATED.3IONS-SCNC: 12 MMOL/L (ref 3–16)
AST SERPL-CCNC: 27 U/L (ref 15–37)
BASOPHILS ABSOLUTE: 0 K/UL (ref 0–0.2)
BASOPHILS RELATIVE PERCENT: 0.5 %
BILIRUB SERPL-MCNC: 0.3 MG/DL (ref 0–1)
BUN BLDV-MCNC: 8 MG/DL (ref 7–20)
CALCIUM SERPL-MCNC: 9.2 MG/DL (ref 8.3–10.6)
CHLORIDE BLD-SCNC: 103 MMOL/L (ref 99–110)
CO2: 21 MMOL/L (ref 21–32)
CREAT SERPL-MCNC: 0.6 MG/DL (ref 0.6–1.1)
D DIMER: 0.41 UG/ML FEU (ref 0–0.6)
EOSINOPHILS ABSOLUTE: 0.1 K/UL (ref 0–0.6)
EOSINOPHILS RELATIVE PERCENT: 1.2 %
GFR AFRICAN AMERICAN: >60
GFR NON-AFRICAN AMERICAN: >60
GLUCOSE BLD-MCNC: 108 MG/DL (ref 70–99)
HCG QUALITATIVE: NEGATIVE
HCT VFR BLD CALC: 38.2 % (ref 36–48)
HEMOGLOBIN: 13.4 G/DL (ref 12–16)
LYMPHOCYTES ABSOLUTE: 1.1 K/UL (ref 1–5.1)
LYMPHOCYTES RELATIVE PERCENT: 20.2 %
MCH RBC QN AUTO: 28.3 PG (ref 26–34)
MCHC RBC AUTO-ENTMCNC: 35.1 G/DL (ref 31–36)
MCV RBC AUTO: 80.7 FL (ref 80–100)
MONOCYTES ABSOLUTE: 0.6 K/UL (ref 0–1.3)
MONOCYTES RELATIVE PERCENT: 10.6 %
NEUTROPHILS ABSOLUTE: 3.6 K/UL (ref 1.7–7.7)
NEUTROPHILS RELATIVE PERCENT: 67.5 %
PDW BLD-RTO: 13.8 % (ref 12.4–15.4)
PLATELET # BLD: 183 K/UL (ref 135–450)
PMV BLD AUTO: 9.2 FL (ref 5–10.5)
POTASSIUM SERPL-SCNC: 3.8 MMOL/L (ref 3.5–5.1)
RBC # BLD: 4.74 M/UL (ref 4–5.2)
SODIUM BLD-SCNC: 136 MMOL/L (ref 136–145)
TOTAL PROTEIN: 7.3 G/DL (ref 6.4–8.2)
TROPONIN: <0.01 NG/ML
WBC # BLD: 5.4 K/UL (ref 4–11)

## 2022-07-21 PROCEDURE — 84484 ASSAY OF TROPONIN QUANT: CPT

## 2022-07-21 PROCEDURE — 80053 COMPREHEN METABOLIC PANEL: CPT

## 2022-07-21 PROCEDURE — 85379 FIBRIN DEGRADATION QUANT: CPT

## 2022-07-21 PROCEDURE — 99285 EMERGENCY DEPT VISIT HI MDM: CPT

## 2022-07-21 PROCEDURE — 99211 OFF/OP EST MAY X REQ PHY/QHP: CPT | Performed by: NURSE PRACTITIONER

## 2022-07-21 PROCEDURE — 71045 X-RAY EXAM CHEST 1 VIEW: CPT

## 2022-07-21 PROCEDURE — 84703 CHORIONIC GONADOTROPIN ASSAY: CPT

## 2022-07-21 PROCEDURE — 93005 ELECTROCARDIOGRAM TRACING: CPT | Performed by: EMERGENCY MEDICINE

## 2022-07-21 PROCEDURE — 85025 COMPLETE CBC W/AUTO DIFF WBC: CPT

## 2022-07-21 ASSESSMENT — PAIN - FUNCTIONAL ASSESSMENT
PAIN_FUNCTIONAL_ASSESSMENT: NONE - DENIES PAIN
PAIN_FUNCTIONAL_ASSESSMENT: NONE - DENIES PAIN

## 2022-07-21 ASSESSMENT — ENCOUNTER SYMPTOMS
NAUSEA: 1
WHEEZING: 0
SHORTNESS OF BREATH: 1
SINUS PAIN: 1
RHINORRHEA: 1
SORE THROAT: 1
DIARRHEA: 1

## 2022-07-21 ASSESSMENT — PAIN SCALES - GENERAL: PAINLEVEL_OUTOF10: 0

## 2022-07-21 NOTE — PROGRESS NOTES
Armando Carrizales (:  1985) is a Established patient, here for evaluation of the following:    Positive For Covid-19 (Symptoms x 3 days, ) and Irregular Heart Beat (Going on for the past few days)       Assessment & Plan:  Below is the assessment and plan developed based on review of pertinent history, physical exam, labs, studies, and medications. 1. Irregular heart beat  No follow-ups on file. Patient is COVID+ and needs a hands on physical exam. No exam available at PCP office. Patient directed to go to the ED for evaluation of irregular heartbeat (irregularly tachycardic at visit) and chest pain. Subjective:   URI   This is a new problem. The current episode started in the past 7 days (since Monday). The problem has been gradually worsening. There has been no fever. Associated symptoms include chest pain, congestion, diarrhea, headaches, nausea, rhinorrhea, sinus pain and a sore throat. Pertinent negatives include no wheezing. Other  This is a new (Irregular tachycardia) problem. The current episode started in the past 7 days. The problem has been waxing and waning. Associated symptoms include chest pain, congestion, headaches, nausea and a sore throat. The symptoms are aggravated by stress, exertion and walking. She has tried rest for the symptoms. The treatment provided no relief. Review of Systems   HENT:  Positive for congestion, postnasal drip, rhinorrhea, sinus pain and sore throat. Ear itching   Respiratory:  Positive for shortness of breath (with activity). Negative for wheezing. Cardiovascular:  Positive for chest pain and palpitations. Gastrointestinal:  Positive for diarrhea and nausea. Neurological:  Positive for headaches. Negative for dizziness and light-headedness. Patient is concerned that her spO2 levels are normal but her heart rate is fluctuating, very irregular. Took sudafed yesterday, none today.  Also with left sided chest pain that she associates with stress. Has had this once in the past and attributed it to too much caffeine. No excessive caffeine intake today. Resumed OCP's on Sunday.      Objective:  Patient-Reported Vitals  No data recorded     Patient-Reported Vitals 12/16/2021   Patient-Reported Weight 183lb   Patient-Reported Pulse 113   Patient-Reported SpO2 98        Physical Exam:  [INSTRUCTIONS:  \"[x]\" Indicates a positive item  \"[]\" Indicates a negative item  -- DELETE ALL ITEMS NOT EXAMINED]    Constitutional: [x] Appears well-developed and well-nourished [x] No apparent distress      [] Abnormal -     Mental status: [x] Alert and awake  [x] Oriented to person/place/time [x] Able to follow commands    [] Abnormal -     Eyes:   EOM    [x]  Normal    [] Abnormal -   Sclera  [x]  Normal    [] Abnormal -          Discharge [x]  None visible   [] Abnormal -     HENT: [x] Normocephalic, atraumatic  [] Abnormal -   [] Mouth/Throat: Mucous membranes are moist    External Ears [x] Normal  [] Abnormal -    Neck: [x] No visualized mass [] Abnormal -     Pulmonary/Chest: [x] Respiratory effort normal   [x] No visualized signs of difficulty breathing or respiratory distress        [] Abnormal -      Musculoskeletal:   [x] No signs of ataxia         [x] Normal range of motion of neck        [] Abnormal -     Neurological:        [x] No Facial Asymmetry (Cranial nerve 7 motor function) (limited exam due to video visit)          [x] No gaze palsy        [] Abnormal -          Skin:        [x] No significant exanthematous lesions or discoloration noted on facial skin         [] Abnormal -            Psychiatric:       [x] Normal Affect [] Abnormal -        [] No Hallucinations    Other pertinent observable physical exam findings:-        On this date 7/21/2022 I have spent 9 minutes reviewing previous notes, test results and face to face (virtual) with the patient discussing the diagnosis and importance of compliance with the treatment plan as well as documenting on the day of the visit. Veronique Collier, was evaluated through a synchronous (real-time) audio-video encounter. The patient (or guardian if applicable) is aware that this is a billable service, which includes applicable co-pays. This Virtual Visit was conducted with patient's (and/or legal guardian's) consent. The visit was conducted pursuant to the emergency declaration under the [address] [place] [address] authority and the [place] and [employer] Act. Patient identification was verified, and a caregiver was present when appropriate. The patient was located at Home: [address] [place], [place] [id number].    [facility] [address] [facility]

## 2022-07-21 NOTE — ED PROVIDER NOTES
[facility]  ED  EMERGENCY DEPARTMENT ENCOUNTER      Pt Name: Huy Ndiaye  MRN: [id number]  [place] 1985  Date of evaluation: 7/21/2022  Provider: Francisca Davis MD    CHIEF COMPLAINT       Chief Complaint   Patient presents with    Positive For Covid-19     Tested yesterday    Chest Pain     Checked pulse today using pulse ox and manually, has been irregular. Left side chest pain since Monday off and on. HISTORY OF PRESENT ILLNESS   (Location/Symptom, Timing/Onset, Context/Setting, Quality, Duration, Modifying Factors, Severity)  Note limiting factors. Huy Ndiaye is a 39 y.o. female with past medical history of hyperlipidemia and anxiety here today with palpitations after recent COVID-19 infection. Patient states 2 days ago she began to have runny nose, nasal congestion mild sore throat diarrhea. She tested positive for COVID yesterday. She notes today she was checking her pulse ox to make sure she was not hypoxic and she noted that her heart rate was somewhat irregular. 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 She states she was having no chest pain at that time but now feels a mild discomfort. No significant shortness of breath. Mild cough. No hemoptysis. No lower extremity swelling, redness or pain. No history of DVT/PE. She is on birth control otherwise. She states she more or less feels okay but just wanted to get checked out    HPI    Nursing Notes were reviewed. REVIEW OF SYSTEMS    (2-9 systems for level 4, 10 or more for level 5)     Review of Systems    Please see HPI for pertinent positive and negative review of system findings. A full 10 system ROS was performed and otherwise negative.         PAST MEDICAL HISTORY     Past Medical History:   Diagnosis Date    Abnormal Pap smear of cervix 2007    HPV    Allergic rhinitis     Fracture, toe 9/05    4th toe left foot Hypercholesterolemia 2007    isolated; managed with diet    Mental disorder     anxiety, depression - no meds         SURGICAL HISTORY       Past Surgical History:   Procedure Laterality Date    ANKLE SURGERY  multiple from 3/01-7/07    left    COLPOSCOPY  2007    LAPAROSCOPY Left 9/12/2019    DIAGNOSTIC LAPAROSCOPIC LEFT SALPINGO - OOPHORECTOMY performed by Wicho Muñiz MD at [facility]       Previous Medications    CETIRIZINE (ZYRTEC) 10 MG TABLET    Take 10 mg by mouth daily    LORATADINE (CLARITIN) 10 MG CAPSULE    Take by mouth    TRI-SPRINTEC 0.18/0.215/0.25 MG-35 MCG TABS           ALLERGIES     Kiwi extract and Other    FAMILY HISTORY       Family History   Problem Relation Age of Onset    High Cholesterol Mother     High Blood Pressure Mother     High Blood Pressure Father     Cancer Father         prostate    High Cholesterol Maternal Grandmother           SOCIAL HISTORY       Social History     Socioeconomic History    Marital status:    Occupational History    Occupation:      Comment: [employer]   Tobacco Use    Smoking status: Never    Smokeless tobacco: Never   Vaping Use    Vaping Use: Never used   Substance and Sexual Activity    Alcohol use: Yes     Alcohol/week: 0.0 standard drinks     Comment: a month    Drug use: No    Sexual activity: Yes     Partners: Male   Social History Narrative    Not exercising, running after toddler, eats out regularly. 1 cup coffee and 1 soda daily.        SCREENINGS    [place] Coma Scale  Eye Opening: Spontaneous  Best Verbal Response: Oriented  Best Motor Response: Obeys commands  Amanda Coma Scale Score: 15          PHYSICAL EXAM    (up to 7 for level 4, 8 or more for level 5)     ED Triage Vitals [07/21/22 1746]   BP Temp Temp Source Heart Rate Resp SpO2 Height Weight   (!) 144/95 98.1 °F (36.7 °C) Oral (!) 106 18 97 % 5' 3\" (1.6 m) 185 lb (83.9 kg) Physical Exam    General appearance:  Cooperative. No acute distress. Skin:  Warm. Dry. Eye:  Extraocular movements intact. Ears, nose, mouth and throat:  Oral mucosa moist,  Neck:  Trachea midline. Heart: Irregular rhythm with normal rate  Perfusion:  intact  Respiratory:  Lungs clear to auscultation bilaterally. Respirations nonlabored. Abdominal:   Non distended. Nontender  Neurological:  Alert and oriented x 3. Moves all extremities spontaneously  Musculoskeletal:   Normal ROM, no deformities          Psychiatric:  Normal mood      DIAGNOSTIC RESULTS       Labs Reviewed   COMPREHENSIVE METABOLIC PANEL - Abnormal; Notable for the following components:       Result Value    Glucose 108 (*)     All other components within normal limits   CBC WITH AUTO DIFFERENTIAL   D-DIMER, QUANTITATIVE   TROPONIN   HCG, SERUM, QUALITATIVE       Interpretation per the Radiologist below, if obtained/available at the time of this note:    XR CHEST PORTABLE   Final Result   No acute cardiopulmonary disease. All other labs/imaging were within normal range or not returned as of this dictation. EMERGENCY DEPARTMENT COURSE and DIFFERENTIAL DIAGNOSIS/MDM:   Vitals:    Vitals:    07/21/22 1746 07/21/22 1857   BP: (!) 144/95 130/64   Pulse: (!) 106 65   Resp: 18 16   Temp: 98.1 °F (36.7 °C) 98.8 °F (37.1 °C)   TempSrc: Oral Oral   SpO2: 97% 100%   Weight: 185 lb (83.9 kg)    Height: 5' 3\" (1.6 m)        EKG: Sinus tachycardia rate of 106 bpm with frequent PVCs. No ST elevation. No prior. Patient presents emergency department today complaining of palpitations. No history of cardiac disease. Currently having occasional but fairly frequent PVCs. Consider possible PE but D-dimer negative and otherwise fairly low risk. Troponin negative. EKG shows no ischemic changes. Patient does not use excessive caffeine but is under a significant amount of stress currently has COVID.   No signs of cardiomyopathy. Patient reassured we will continue to monitor at home but otherwise electrolytes are within normal limits and she will be discharged with outpatient follow-up    MDM      CONSULTS     None    Critical Care:   None    REASSESSMENT          PROCEDURE     Unless otherwise noted below, none     Procedures      FINAL IMPRESSION      1. COVID-19 virus infection    2. Frequent PVCs            DISPOSITION/PLAN   DISPOSITION Decision To Discharge 07/21/2022 07:07:28 PM        PATIENT REFERRED TO:  Aleksandr Marshall MD  [facility] [address]  [phone]    Schedule an appointment as soon as possible for a visit         DISCHARGE MEDICATIONS:  New Prescriptions    No medications on file     Controlled Substances Monitoring:     No flowsheet data found.     (Please note that portions of this note were completed with a voice recognition program.  Efforts were made to edit the dictations but occasionally words are mis-transcribed.)    Marnie Reyes MD (electronically signed)  Attending Emergency Physician            Savage Livingston MD  07/21/22 2028

## 2022-07-21 NOTE — LETTER
I had the pleasure of seeing Peggy Au today for a primary care virtualist video visit secondary to irregular heartbeat and COVID-19. I have provided the following recommendations: go to the ED for evaluation. I have included my note for your review and have asked the patient to follow up with you after her ED evaluation once she is allowed to leave isolation. If you have questions, please reach out via WhatsOpen secure messaging by searching for the [facility] Primary Care Virtualists. Your communication will be answered promptly by the Virtualist on service for the day. Additionally, we would love your overall feedback on this visit. Please hit shift and click the following link to let us know if the Virtualist service met your expectations. [email or web address]      Electronically signed by SU Rangel CNP on 7/21/22 at 4:25 PM EDT.

## 2022-07-22 ENCOUNTER — CARE COORDINATION (OUTPATIENT)
Dept: CARE COORDINATION | Age: 37
End: 2022-07-22

## 2022-07-22 LAB
EKG ATRIAL RATE: 106 BPM
EKG DIAGNOSIS: NORMAL
EKG P AXIS: 58 DEGREES
EKG P-R INTERVAL: 152 MS
EKG Q-T INTERVAL: 364 MS
EKG QRS DURATION: 94 MS
EKG QTC CALCULATION (BAZETT): 483 MS
EKG R AXIS: 62 DEGREES
EKG T AXIS: 31 DEGREES
EKG VENTRICULAR RATE: 106 BPM

## 2022-07-22 PROCEDURE — 93010 ELECTROCARDIOGRAM REPORT: CPT | Performed by: INTERNAL MEDICINE

## 2022-07-22 NOTE — CARE COORDINATION
Patient contacted regarding COVID-19 diagnosis and pulse oximeter ordered at discharge. Discussed COVID-19 related testing which was not done at this time. Test results were not done. Patient informed of results, if available? Pt tested positive Wed 22. Ambulatory Care Manager contacted the patient by telephone to perform post discharge assessment. Call within 2 business days of discharge: Yes. Verified name and  with patient as identifiers. Provided introduction to self, and explanation of the CTN/ACM role, and reason for call due to risk factors for infection and/or exposure to COVID-19. Symptoms reviewed with patient who verbalized the following symptoms: cough  no new symptoms  no worsening symptoms. Due to no new or worsening symptoms encounter was not routed to provider for escalation. Discussed follow-up appointments. If no appointment was previously scheduled, appointment scheduling offered: reports already has a message into office. [facility] follow up appointment(s): No future appointments. Non-[facility] follow up appointment(s): na    Non-face-to-face services provided:  See above     Advance Care Planning:   Does patient have an Advance Directive:  not on file. Educated patient about risk for severe COVID-19 due to risk factors according to CDC guidelines. ACM reviewed discharge instructions, medical action plan and red flag symptoms with the patient who verbalized understanding. Discussed COVID vaccination status: Yes. Education provided on COVID-19 vaccination as appropriate. Discussed exposure protocols and quarantine with CDC Guidelines. Patient was given an opportunity to verbalize any questions and concerns and agrees to contact ACM or health care provider for questions related to their healthcare.     Reviewed and educated patient on any new and changed medications related to discharge diagnosis     Was patient discharged with a pulse oximeter? yes, discussed and confirmed pulse oximeter discharge instructions and when to notify provider or seek emergency care. ACM provided contact information. Plan for follow-up call in 5-7 days based on severity of symptoms and risk factors. Reports is doing ok just coughing a lot. Taking Zyretec and cough drops. .Increase fluids, warm teas, broths, warm mist humidifier use with HOB up,and warm salt water gargles as needed. Sats are 99% on room air at this time. Does feel like her heart rate gets irregular at times like it did when at hospital but feels it's probably from stress or the cough from covid. Denies any dizziness or sob.

## 2022-07-26 ENCOUNTER — CARE COORDINATION (OUTPATIENT)
Dept: CARE COORDINATION | Age: 37
End: 2022-07-26

## 2022-08-09 ENCOUNTER — OFFICE VISIT (OUTPATIENT)
Dept: FAMILY MEDICINE CLINIC | Age: 37
End: 2022-08-09
Payer: COMMERCIAL

## 2022-08-09 VITALS
DIASTOLIC BLOOD PRESSURE: 60 MMHG | SYSTOLIC BLOOD PRESSURE: 124 MMHG | OXYGEN SATURATION: 99 % | HEART RATE: 98 BPM | BODY MASS INDEX: 32.89 KG/M2 | WEIGHT: 185.6 LBS | HEIGHT: 63 IN

## 2022-08-09 DIAGNOSIS — R00.2 PALPITATIONS: ICD-10-CM

## 2022-08-09 DIAGNOSIS — F41.9 ANXIETY: Primary | ICD-10-CM

## 2022-08-09 PROCEDURE — 99215 OFFICE O/P EST HI 40 MIN: CPT | Performed by: FAMILY MEDICINE

## 2022-08-09 ASSESSMENT — PATIENT HEALTH QUESTIONNAIRE - PHQ9
SUM OF ALL RESPONSES TO PHQ QUESTIONS 1-9: 8
1. LITTLE INTEREST OR PLEASURE IN DOING THINGS: 0
8. MOVING OR SPEAKING SO SLOWLY THAT OTHER PEOPLE COULD HAVE NOTICED. OR THE OPPOSITE, BEING SO FIGETY OR RESTLESS THAT YOU HAVE BEEN MOVING AROUND A LOT MORE THAN USUAL: 0
9. THOUGHTS THAT YOU WOULD BE BETTER OFF DEAD, OR OF HURTING YOURSELF: 0
6. FEELING BAD ABOUT YOURSELF - OR THAT YOU ARE A FAILURE OR HAVE LET YOURSELF OR YOUR FAMILY DOWN: 0
SUM OF ALL RESPONSES TO PHQ QUESTIONS 1-9: 8
2. FEELING DOWN, DEPRESSED OR HOPELESS: 2
5. POOR APPETITE OR OVEREATING: 0
3. TROUBLE FALLING OR STAYING ASLEEP: 3
SUM OF ALL RESPONSES TO PHQ QUESTIONS 1-9: 8
SUM OF ALL RESPONSES TO PHQ9 QUESTIONS 1 & 2: 2
10. IF YOU CHECKED OFF ANY PROBLEMS, HOW DIFFICULT HAVE THESE PROBLEMS MADE IT FOR YOU TO DO YOUR WORK, TAKE CARE OF THINGS AT HOME, OR GET ALONG WITH OTHER PEOPLE: 1
SUM OF ALL RESPONSES TO PHQ QUESTIONS 1-9: 8
7. TROUBLE CONCENTRATING ON THINGS, SUCH AS READING THE NEWSPAPER OR WATCHING TELEVISION: 0
4. FEELING TIRED OR HAVING LITTLE ENERGY: 3

## 2022-08-09 ASSESSMENT — ENCOUNTER SYMPTOMS: SHORTNESS OF BREATH: 0

## 2022-08-09 NOTE — PROGRESS NOTES
were detected on that twelve-lead EKG. Current Outpatient Medications   Medication Sig Dispense Refill    sertraline (ZOLOFT) 50 MG tablet Take 1 tablet by mouth in the morning. 30 tablet 3    loratadine (CLARITIN) 10 MG capsule Take by mouth      cetirizine (ZYRTEC) 10 MG tablet Take 10 mg by mouth daily      TRI-SPRINTEC 0.18/0.215/0.25 MG-35 MCG TABS        No current facility-administered medications for this visit. Patients past medical history, surgical history, family history, medications and allergies were all reviewed and updated as appropriate today. Review of Systems   Constitutional:  Negative for fever. Respiratory:  Negative for shortness of breath. Cardiovascular:  Positive for palpitations. Negative for chest pain and leg swelling. Neurological:  Positive for light-headedness. Negative for syncope. Psychiatric/Behavioral:  Positive for dysphoric mood. The patient is nervous/anxious. Physical Exam  Vitals reviewed. Constitutional:       General: She is not in acute distress. Appearance: She is not ill-appearing or toxic-appearing. Cardiovascular:      Rate and Rhythm: Normal rate and regular rhythm. Heart sounds: Normal heart sounds. No murmur heard. Pulmonary:      Effort: Pulmonary effort is normal.      Breath sounds: Normal breath sounds. No wheezing or rales. Neurological:      General: No focal deficit present. Mental Status: She is alert. Psychiatric:         Behavior: Behavior normal.         Thought Content:  Thought content normal.         Judgment: Judgment normal.      Comments: Tearful at times but appropriate         Vitals:    08/09/22 1432 08/09/22 1455   BP: 132/64 124/60   Pulse: 98    SpO2: 99%    Weight: 185 lb 9.6 oz (84.2 kg)    Height: 5' 3\" (1.6 m)        Assessment/Plan:   Kacie Mosley was seen today for follow-up from hospital.    Diagnoses and all orders for this visit:    Anxiety, after long discussion with the patient she is agreeable with starting sertraline. She will break the pill in half her dose of 25 mg for the first week then increase to 50 mg a day. She was also given information to schedule with our behavioral health counselor. Combination of counseling and medication will hopefully help her move forward with her anxiety. Patient will send a [employer] message in a couple weeks with an update on her symptoms or sooner if problems worsen  -     sertraline (ZOLOFT) 50 MG tablet; Take 1 tablet by mouth in the morning. Palpitations, reviewed the EKG that was done at her employer. See scanned EKG. We will check a TSH this was not done with the other lab work done in the emergency room. Offered a Holter monitor for the patient. Right now she prefers to see how the Zoloft helps her overall anxiety and PVCs. If she continues to have palpitations and feeling lightheaded or dizzy she will let me know then we will proceed more aggressively with a cardiac work-up with Holter monitor possible echo.   -     TSH    I spent a total of 45* minutes with the patient, reviewing previous notes, consults, test results, imaging ,discussing chronic and acute conditions

## 2022-08-10 LAB — TSH SERPL DL<=0.05 MIU/L-ACNC: 0.95 UIU/ML (ref 0.27–4.2)

## 2022-12-26 DIAGNOSIS — F41.9 ANXIETY: ICD-10-CM

## 2022-12-26 NOTE — TELEPHONE ENCOUNTER
Pt was supposed to schedule a 3 month f/u in November but did not do so. I will call the pt from the office tomorrow, 12/27/22, and try to schedule the pt.     Fransisca Padgett is requesting refill(s) sertraline  Last OV 8/9/22 (pertaining to medication)  LR 8/9/22 (per medication requested)  Next office visit scheduled or attempted No   If no, reason:

## 2022-12-28 ENCOUNTER — OFFICE VISIT (OUTPATIENT)
Dept: FAMILY MEDICINE CLINIC | Age: 37
End: 2022-12-28
Payer: COMMERCIAL

## 2022-12-28 VITALS
HEART RATE: 109 BPM | BODY MASS INDEX: 34.34 KG/M2 | DIASTOLIC BLOOD PRESSURE: 78 MMHG | HEIGHT: 63 IN | OXYGEN SATURATION: 98 % | WEIGHT: 193.8 LBS | SYSTOLIC BLOOD PRESSURE: 120 MMHG

## 2022-12-28 DIAGNOSIS — F41.9 ANXIETY: Primary | ICD-10-CM

## 2022-12-28 PROBLEM — Z37.9 NORMAL LABOR: Status: RESOLVED | Noted: 2018-02-23 | Resolved: 2022-12-28

## 2022-12-28 PROCEDURE — 99213 OFFICE O/P EST LOW 20 MIN: CPT | Performed by: FAMILY MEDICINE

## 2022-12-28 NOTE — PROGRESS NOTES
Patient:  Jung weir 44 y.o. Librado Schlatter presents today with the following Chief Complaint(s):  Chief Complaint   Patient presents with    Anxiety     Pt is here for a 3 month f/u, she states that the 50 mg of Sertraline seems to be helping her       Patient was seen in August and started on Zoloft. Her initial dose was 25 mg and she increased it to 50 mg. She says she does find this medication helpful. It has helped her take the edge off of the multiple stressors in her life, but has not caused her emotions to dull. She finds that she has better balance in her life. Her  has been helping with their young child. Gives her time to focus on self-care. Patient has not scheduled with a counselor yet. She says she is not ready to commit to counseling as of yet. She does feel her PVCs in times of stress but it is not occurred on a frequent basis. Current Outpatient Medications   Medication Sig Dispense Refill    sertraline (ZOLOFT) 50 MG tablet Take 1 tablet by mouth daily 30 tablet 3    loratadine (CLARITIN) 10 MG capsule Take by mouth      cetirizine (ZYRTEC) 10 MG tablet Take 10 mg by mouth daily      TRI-SPRINTEC 0.18/0.215/0.25 MG-35 MCG TABS        No current facility-administered medications for this visit. Patients past medical history, surgical history, family history, medications and allergies were all reviewed and updated as appropriate today. Review of Systems   Psychiatric/Behavioral:  Negative for dysphoric mood. The patient is not nervous/anxious. Physical Exam  Vitals reviewed. Cardiovascular:      Heart sounds: Normal heart sounds. Pulmonary:      Breath sounds: Normal breath sounds. Neurological:      Mental Status: She is alert. Psychiatric:         Mood and Affect: Mood normal.         Behavior: Behavior normal.         Thought Content:  Thought content normal.         Judgment: Judgment normal.         Vitals:    12/28/22 1458   BP: 120/78 Pulse: (!) 109   SpO2: 98%   Weight: 193 lb 12.8 oz (87.9 kg)   Height: 5' 3\" (1.6 m)       Assessment/Plan:   Tico aCrter was seen today for anxiety. Diagnoses and all orders for this visit:    Anxiety, improved continue Zoloft 50 mg no need to make any increases in her dose at this time. She does not need a refill at this time. She was encouraged to find time to exercise on a consistent basis. Patient thinks that she may be able to discontinue her medication in several months. We talked about decreasing her dose to 25 mg for about 4 weeks and then 25 mg daily for at least 2 weeks and she can discontinue it if tolerated.   Patient was asked to contact her OB should she decide to conceive

## 2023-04-03 DIAGNOSIS — F41.9 ANXIETY: ICD-10-CM

## 2023-04-03 NOTE — TELEPHONE ENCOUNTER
Robert Martin is requesting refill(s) sertraline  Last OV 12/28/22 (pertaining to medication)  LR 12/27/22 (per medication requested)  Next office visit scheduled or attempted No   If no, reason:  pt is due in June       Patient Comment: I was hoping to be tapering off by now, but I just lost my job and that doesnt feel like the best circumstances for med changes

## 2023-04-28 DIAGNOSIS — F41.9 ANXIETY: ICD-10-CM

## 2023-07-26 ENCOUNTER — TELEPHONE (OUTPATIENT)
Dept: FAMILY MEDICINE CLINIC | Age: 38
End: 2023-07-26

## 2023-07-26 NOTE — TELEPHONE ENCOUNTER
Pt has been seeing Saray Alvarado who is Orthopedic surgeon for past x20 years for ankle and foot pain.  used to sign handicap placard. Saray Alvarado is now referring patients to PCP for Handicap Placard. Pt was wondering if  can sign for Handicap Placard?

## 2023-07-31 NOTE — TELEPHONE ENCOUNTER
Please let patient know I last saw her in December 2022. I wanted to follow-up with her for her anxiety 6 months later.   I suggest that she make a follow-up appointment for anxiety we can talk about her handicap parking then

## 2023-07-31 NOTE — TELEPHONE ENCOUNTER
Patient calling to check back into this and see if this is something Jordan Galicias can do or not, I did advise her that this is not something we typically write a permanent handicap placard for this should be handled by the physician who is managing her for this condition. She insisted this be sent to Jordan Muhammad anyway.

## 2023-08-18 SDOH — ECONOMIC STABILITY: HOUSING INSECURITY
IN THE LAST 12 MONTHS, WAS THERE A TIME WHEN YOU DID NOT HAVE A STEADY PLACE TO SLEEP OR SLEPT IN A SHELTER (INCLUDING NOW)?: NO

## 2023-08-18 SDOH — ECONOMIC STABILITY: TRANSPORTATION INSECURITY
IN THE PAST 12 MONTHS, HAS LACK OF TRANSPORTATION KEPT YOU FROM MEETINGS, WORK, OR FROM GETTING THINGS NEEDED FOR DAILY LIVING?: NO

## 2023-08-18 SDOH — ECONOMIC STABILITY: FOOD INSECURITY: WITHIN THE PAST 12 MONTHS, YOU WORRIED THAT YOUR FOOD WOULD RUN OUT BEFORE YOU GOT MONEY TO BUY MORE.: NEVER TRUE

## 2023-08-18 SDOH — ECONOMIC STABILITY: FOOD INSECURITY: WITHIN THE PAST 12 MONTHS, THE FOOD YOU BOUGHT JUST DIDN'T LAST AND YOU DIDN'T HAVE MONEY TO GET MORE.: NEVER TRUE

## 2023-08-18 SDOH — ECONOMIC STABILITY: INCOME INSECURITY: HOW HARD IS IT FOR YOU TO PAY FOR THE VERY BASICS LIKE FOOD, HOUSING, MEDICAL CARE, AND HEATING?: SOMEWHAT HARD

## 2023-08-18 ASSESSMENT — PATIENT HEALTH QUESTIONNAIRE - PHQ9
SUM OF ALL RESPONSES TO PHQ QUESTIONS 1-9: 1
5. POOR APPETITE OR OVEREATING: 0
SUM OF ALL RESPONSES TO PHQ QUESTIONS 1-9: 1
1. LITTLE INTEREST OR PLEASURE IN DOING THINGS: NOT AT ALL
6. FEELING BAD ABOUT YOURSELF - OR THAT YOU ARE A FAILURE OR HAVE LET YOURSELF OR YOUR FAMILY DOWN: 0
1. LITTLE INTEREST OR PLEASURE IN DOING THINGS: 0
SUM OF ALL RESPONSES TO PHQ9 QUESTIONS 1 & 2: 1
8. MOVING OR SPEAKING SO SLOWLY THAT OTHER PEOPLE COULD HAVE NOTICED. OR THE OPPOSITE, BEING SO FIGETY OR RESTLESS THAT YOU HAVE BEEN MOVING AROUND A LOT MORE THAN USUAL: 0
SUM OF ALL RESPONSES TO PHQ9 QUESTIONS 1 & 2: 1
SUM OF ALL RESPONSES TO PHQ QUESTIONS 1-9: 1
3. TROUBLE FALLING OR STAYING ASLEEP: 0
SUM OF ALL RESPONSES TO PHQ QUESTIONS 1-9: 1
2. FEELING DOWN, DEPRESSED OR HOPELESS: SEVERAL DAYS
7. TROUBLE CONCENTRATING ON THINGS, SUCH AS READING THE NEWSPAPER OR WATCHING TELEVISION: 0
10. IF YOU CHECKED OFF ANY PROBLEMS, HOW DIFFICULT HAVE THESE PROBLEMS MADE IT FOR YOU TO DO YOUR WORK, TAKE CARE OF THINGS AT HOME, OR GET ALONG WITH OTHER PEOPLE: 0
9. THOUGHTS THAT YOU WOULD BE BETTER OFF DEAD, OR OF HURTING YOURSELF: 0
4. FEELING TIRED OR HAVING LITTLE ENERGY: 0
2. FEELING DOWN, DEPRESSED OR HOPELESS: 1

## 2023-08-21 ENCOUNTER — TELEPHONE (OUTPATIENT)
Dept: CARDIOLOGY CLINIC | Age: 38
End: 2023-08-21

## 2023-08-21 ENCOUNTER — OFFICE VISIT (OUTPATIENT)
Dept: FAMILY MEDICINE CLINIC | Age: 38
End: 2023-08-21
Payer: COMMERCIAL

## 2023-08-21 VITALS
OXYGEN SATURATION: 98 % | DIASTOLIC BLOOD PRESSURE: 70 MMHG | SYSTOLIC BLOOD PRESSURE: 122 MMHG | BODY MASS INDEX: 36.64 KG/M2 | WEIGHT: 206.8 LBS | HEIGHT: 63 IN | HEART RATE: 66 BPM

## 2023-08-21 DIAGNOSIS — M25.572 CHRONIC PAIN OF LEFT ANKLE: ICD-10-CM

## 2023-08-21 DIAGNOSIS — I49.9 IRREGULAR HEART BEAT: Primary | ICD-10-CM

## 2023-08-21 DIAGNOSIS — I49.3 FREQUENT PVCS: ICD-10-CM

## 2023-08-21 DIAGNOSIS — R73.9 ELEVATED BLOOD SUGAR: ICD-10-CM

## 2023-08-21 DIAGNOSIS — G89.29 CHRONIC PAIN OF LEFT ANKLE: ICD-10-CM

## 2023-08-21 PROCEDURE — 99214 OFFICE O/P EST MOD 30 MIN: CPT | Performed by: FAMILY MEDICINE

## 2023-08-21 PROCEDURE — 93000 ELECTROCARDIOGRAM COMPLETE: CPT | Performed by: FAMILY MEDICINE

## 2023-08-21 ASSESSMENT — ENCOUNTER SYMPTOMS
SHORTNESS OF BREATH: 0
CHEST TIGHTNESS: 0

## 2023-08-21 NOTE — TELEPHONE ENCOUNTER
Pts pcp office called to schedule pt a new pt ov due to frequent pvc's and Trigeminy patterns. Pt was offered 08/22/2023 at 12pm with Dr. Efren Alan. Pt was unable to make that and is scheduled for the next available week on 09/07/2023 with Dr. Efren Alan.

## 2023-08-21 NOTE — PROGRESS NOTES
Patient:  Karolee Holter a 44 y.o. Frank Dumont presents today with the following Chief Complaint(s):  Chief Complaint   Patient presents with    Anxiety     Pt states that she is here for a f/u, is not fasting for bw    Letter     Pt is requesting a handicap parking letter       Patient initially made the appointment to follow-up from anxiety. She had been on Zoloft, however patient was in a better situation and felt that she could taper off. She tapered off and has been completely off Zoloft for at least the last 1 month. Overall she is feeling well without anxiety. She does not feel like she needs to go back on the medication at this point in time. 1 year ago, patient was in the emergency room for acute anxiety and was having frequent PVCs. Today the patient is again having frequent PVCs, however she is asymptomatic. She denies any chest pain palpitations lightheadedness or dizziness. She was not aware of her frequent PVCs. She generally has 1 cup of coffee and 1 soda per day no change in that. No recent decongestants or stimulant medications. Patient also has a 20-year history of left ankle pain following a accident. She has had at least 12 surgeries on her left ankle. The orthopedic doctor was writing the handicap parking letter for her however now refuses to do so. We have not been treating her ankle pain however she does need to continue with the handicap parking. Patient had elevated blood sugar on one of her readings of 108. This was calling for a diabetes screen we will take care of that lab work today while she is here        Current Outpatient Medications   Medication Sig Dispense Refill    loratadine (CLARITIN) 10 MG capsule Take by mouth      cetirizine (ZYRTEC) 10 MG tablet Take 1 tablet by mouth daily      TRI-SPRINTEC 0.18/0.215/0.25 MG-35 MCG TABS        No current facility-administered medications for this visit.        Patients past medical history, surgical history, family

## 2023-08-22 LAB
ALBUMIN SERPL-MCNC: 4.2 G/DL (ref 3.4–5)
ALBUMIN/GLOB SERPL: 1.8 {RATIO} (ref 1.1–2.2)
ALP SERPL-CCNC: 104 U/L (ref 40–129)
ALT SERPL-CCNC: 24 U/L (ref 10–40)
ANION GAP SERPL CALCULATED.3IONS-SCNC: 15 MMOL/L (ref 3–16)
AST SERPL-CCNC: 19 U/L (ref 15–37)
BILIRUB SERPL-MCNC: <0.2 MG/DL (ref 0–1)
BUN SERPL-MCNC: 9 MG/DL (ref 7–20)
CALCIUM SERPL-MCNC: 9.4 MG/DL (ref 8.3–10.6)
CHLORIDE SERPL-SCNC: 104 MMOL/L (ref 99–110)
CO2 SERPL-SCNC: 20 MMOL/L (ref 21–32)
CREAT SERPL-MCNC: 0.6 MG/DL (ref 0.6–1.1)
EST. AVERAGE GLUCOSE BLD GHB EST-MCNC: 111.2 MG/DL
GFR SERPLBLD CREATININE-BSD FMLA CKD-EPI: >60 ML/MIN/{1.73_M2}
GLUCOSE SERPL-MCNC: 88 MG/DL (ref 70–99)
HBA1C MFR BLD: 5.5 %
POTASSIUM SERPL-SCNC: 4 MMOL/L (ref 3.5–5.1)
PROT SERPL-MCNC: 6.6 G/DL (ref 6.4–8.2)
SODIUM SERPL-SCNC: 139 MMOL/L (ref 136–145)
TSH SERPL DL<=0.005 MIU/L-ACNC: 1.17 UIU/ML (ref 0.27–4.2)

## 2023-08-31 DIAGNOSIS — F41.9 ANXIETY: ICD-10-CM

## 2023-08-31 NOTE — TELEPHONE ENCOUNTER
I spoke with the pt. She states that she is no longer taking this medication. She said that this medication can be refused.

## 2023-09-07 ENCOUNTER — TELEPHONE (OUTPATIENT)
Dept: CARDIOLOGY CLINIC | Age: 38
End: 2023-09-07

## 2023-09-07 ENCOUNTER — OFFICE VISIT (OUTPATIENT)
Dept: CARDIOLOGY CLINIC | Age: 38
End: 2023-09-07
Payer: COMMERCIAL

## 2023-09-07 VITALS
WEIGHT: 205 LBS | HEIGHT: 63 IN | DIASTOLIC BLOOD PRESSURE: 84 MMHG | BODY MASS INDEX: 36.32 KG/M2 | OXYGEN SATURATION: 100 % | HEART RATE: 92 BPM | SYSTOLIC BLOOD PRESSURE: 133 MMHG

## 2023-09-07 DIAGNOSIS — I49.9 IRREGULAR HEART RATE: Primary | ICD-10-CM

## 2023-09-07 DIAGNOSIS — R94.31 ABNORMAL EKG: ICD-10-CM

## 2023-09-07 DIAGNOSIS — I49.3 PVC'S (PREMATURE VENTRICULAR CONTRACTIONS): ICD-10-CM

## 2023-09-07 DIAGNOSIS — R42 LIGHTHEADEDNESS: ICD-10-CM

## 2023-09-07 PROCEDURE — 93225 XTRNL ECG REC<48 HRS REC: CPT | Performed by: INTERNAL MEDICINE

## 2023-09-07 PROCEDURE — 99203 OFFICE O/P NEW LOW 30 MIN: CPT | Performed by: STUDENT IN AN ORGANIZED HEALTH CARE EDUCATION/TRAINING PROGRAM

## 2023-09-07 PROCEDURE — 93000 ELECTROCARDIOGRAM COMPLETE: CPT | Performed by: STUDENT IN AN ORGANIZED HEALTH CARE EDUCATION/TRAINING PROGRAM

## 2023-09-07 NOTE — PATIENT INSTRUCTIONS
Your provider has ordered testing for further evaluation. An order/prescription has been included in your paper work. To schedule outpatient testing, contact Central Scheduling by calling [place] ([phone]).

## 2023-09-07 NOTE — PROGRESS NOTES
CARDIOLOGY CONSULTATION    Patient Name: Radha Gonzalez  Primary Care physician: Rick Baird MD    Reason for Referral/Chief Complaint: Radha Gonzalez is a 44 y.o. patient who is referred to cardiology clinic today for evaluation and treatment of frequent PVC's. History of Present Illness:   Radha Gonzalez is a 44 y.o. female with a significant medical history including anxiety. She follows with her primary care provider Rick Baird MD.    Today she reports she had COVID a year ago and noticed her HR was irregular. She had a virtual visit with her PCP that recommended ED visit at that time to rule out PE. She had PVC's noted on her EKG at that time. She reports she can feel palpitations/fluttering when she is stressed or anxious. She reports occasional lightheadedness with PVC's, but is largely symptomatic. In the office today, she is in sinus rhythm with ventricular trigeminy and patient is unaware. The patient denies chest pain, shortness of breath at rest and dyspnea with exertion. Denies palpitations, near-syncope or sami syncope. Denies paroxysmal nocturnal dyspnea, orthopnea, bendopnea. Father with hypertension (63's) . Maternal grandfather MI (80's). Social alcohol usage; does not seem to make palpitations worse. Denies illict drug use. Denies tobacco use. S She denies excessive caffeine intake. She states gained 20 pounds while she was taking Zoloft and has been off this for 3-4 months. She has swelling to BLE that is chronic, worse since right ankle surgeries and 20 lbs weight gain while she was on zoloft. She does not report an routine exercise regimen. She has chronic ankle pain limits her mobility. Past Medical History:   has a past medical history of Abnormal Pap smear of cervix, Allergic rhinitis, Fracture, toe, Hypercholesterolemia, and Mental disorder.     Surgical History:   has a past surgical history that includes Ankle surgery (multiple from 3/01-7/07); LASIK;

## 2023-09-07 NOTE — TELEPHONE ENCOUNTER
Pt seen in office today with AMP. Michelle cardiac event monitor placed in office. 48 hour monitor. Monitor order and michelle pt intake scanned into chart and routed to [employer].      Michelle cam ID: [id number]

## 2023-09-16 PROCEDURE — 93227 XTRNL ECG REC<48 HR R&I: CPT | Performed by: INTERNAL MEDICINE

## 2023-09-26 ENCOUNTER — TELEPHONE (OUTPATIENT)
Dept: CARDIOLOGY CLINIC | Age: 38
End: 2023-09-26

## 2023-09-26 DIAGNOSIS — I49.3 PVC'S (PREMATURE VENTRICULAR CONTRACTIONS): Primary | ICD-10-CM

## 2023-09-26 DIAGNOSIS — I49.3 PVC'S (PREMATURE VENTRICULAR CONTRACTIONS): ICD-10-CM

## 2023-09-26 RX ORDER — PROPRANOLOL HCL 60 MG
60 CAPSULE, EXTENDED RELEASE 24HR ORAL DAILY
Qty: 90 CAPSULE | Refills: 2 | Status: SHIPPED | OUTPATIENT
Start: 2023-09-26

## 2023-09-26 NOTE — TELEPHONE ENCOUNTER
Spoke to pt, relayed [address] message. Pt v/u. Pt informed me that she is trying to conceive and wanted to know if propranolol is safe? Please advise. Referral placed for EP. Pt was under the assumption that an EP doctor would be preforming her ECHO. Informed pt that a technician would be preforming it, and she would still need EP OV per [address]. Front- please schedule pt appt with EP. Thank you.

## 2023-09-26 NOTE — TELEPHONE ENCOUNTER
----- Message from Pietro Ness MD sent at 9/26/2023  1:56 PM EDT -----  Please notify patient that their monitor shows frequent PVCs  Start propranolol as advised by Dr Melissa Roe - script sent to [facility] referral - next 4-6 weeks

## 2023-09-27 ENCOUNTER — HOSPITAL ENCOUNTER (OUTPATIENT)
Dept: NON INVASIVE DIAGNOSTICS | Age: 38
Discharge: HOME | End: 2023-09-27
Payer: COMMERCIAL

## 2023-09-27 DIAGNOSIS — I49.3 PVC'S (PREMATURE VENTRICULAR CONTRACTIONS): ICD-10-CM

## 2023-09-27 DIAGNOSIS — R94.31 ABNORMAL EKG: ICD-10-CM

## 2023-09-27 PROCEDURE — 93306 TTE W/DOPPLER COMPLETE: CPT

## 2023-09-27 NOTE — TELEPHONE ENCOUNTER
Best if she discusses w/ Dr Estrella Elam who saw her  Have call next week when he returns or can schedule another OV

## 2023-09-28 ENCOUNTER — TELEPHONE (OUTPATIENT)
Dept: CARDIOLOGY CLINIC | Age: 38
End: 2023-09-28

## 2023-09-28 NOTE — TELEPHONE ENCOUNTER
----- Message from Mari Simon MD sent at 9/27/2023  4:18 PM EDT -----  Please notify patient that their echo looks good  Heart fxn normal  Mild valve leak that is commonly seen and not clinically significant

## 2023-10-03 NOTE — TELEPHONE ENCOUNTER
Pt called back to see what Dr. Casper Royal recommends. She stated she has been waiting several days for an answer back to her questions. She did speak with her OB and OB said propanolol is fine to take. Pt wants to know if she has to start at 60 mg dose or can she start at a lower dose and titrate up if needed.

## 2023-10-05 NOTE — TELEPHONE ENCOUNTER
Spoke with pt.  60 mg long acting only comes in capsule form. Pt would prefer to take 60 mg long acting once daily instead of a lower dosage multiple times daily. Pt stated pharmacy already has script for medication. She will let us know if she has any problems with 60 mg.

## 2023-11-17 NOTE — PROGRESS NOTES
present. Mental Status: She is alert and oriented to person, place, and time. Psychiatric:         Mood and Affect: Mood normal.         Behavior: Behavior normal.       ECG Interpretation:  (Date: 11/20/2023)  Rhythm: Sinus rhythm  Rate: 68 BPM  PAC's / PVC's present: None  Conduction abnormalities: Normal AV conduction    Echocardiogram  (Date: 09/27/2023)  Summary   Left ventricular systolic function is normal with ejection fraction   estimated at 55%. Premature atrial contractions are present. No regional wall motion abnormalities. Normal left ventricular size and wall   thickness. Normal left ventricular diastolic filling pressure. Mild mitral regurgitation. The right ventricle is normal in size and function. Stress Test  N/A    Current Medications     Current Outpatient Medications   Medication Sig Dispense Refill    propranolol (INDERAL LA) 60 MG extended release capsule Take 1 capsule by mouth daily 90 capsule 2    cetirizine (ZYRTEC) 10 MG tablet Take 1 tablet by mouth daily       No current facility-administered medications for this visit. Lab Review     Lab Results   Component Value Date/Time     08/21/2023 02:39 PM    K 4.0 08/21/2023 02:39 PM     08/21/2023 02:39 PM    CO2 20 08/21/2023 02:39 PM    BUN 9 08/21/2023 02:39 PM    CREATININE 0.6 08/21/2023 02:39 PM    GLUCOSE 88 08/21/2023 02:39 PM    CALCIUM 9.4 08/21/2023 02:39 PM    LABGLOM >60 08/21/2023 02:39 PM        Lab Results   Component Value Date    WBC 5.4 07/21/2022    HGB 13.4 07/21/2022    HCT 38.2 07/21/2022    MCV 80.7 07/21/2022     07/21/2022       Lab Results   Component Value Date    TSH 1.17 08/21/2023       No results found for: \"BNP\"    I, Chioma Sales RN, am scribing for and in the presence of Dr. Rhonda Stearns.  11/20/23 12:12 PM   Chioma Sales RN

## 2023-11-20 ENCOUNTER — OFFICE VISIT (OUTPATIENT)
Dept: CARDIOLOGY CLINIC | Age: 38
End: 2023-11-20
Payer: COMMERCIAL

## 2023-11-20 VITALS
DIASTOLIC BLOOD PRESSURE: 76 MMHG | WEIGHT: 204.4 LBS | HEIGHT: 63 IN | SYSTOLIC BLOOD PRESSURE: 122 MMHG | OXYGEN SATURATION: 99 % | BODY MASS INDEX: 36.21 KG/M2 | HEART RATE: 68 BPM

## 2023-11-20 DIAGNOSIS — R00.2 PALPITATIONS: ICD-10-CM

## 2023-11-20 DIAGNOSIS — R29.818 SUSPECTED SLEEP APNEA: ICD-10-CM

## 2023-11-20 DIAGNOSIS — I49.3 PVC'S (PREMATURE VENTRICULAR CONTRACTIONS): Primary | ICD-10-CM

## 2023-11-20 DIAGNOSIS — E66.01 SEVERE OBESITY (BMI 35.0-35.9 WITH COMORBIDITY) (HCC): ICD-10-CM

## 2023-11-20 DIAGNOSIS — R42 DIZZINESS: ICD-10-CM

## 2023-11-20 PROCEDURE — 93000 ELECTROCARDIOGRAM COMPLETE: CPT | Performed by: INTERNAL MEDICINE

## 2023-11-20 PROCEDURE — 99204 OFFICE O/P NEW MOD 45 MIN: CPT | Performed by: INTERNAL MEDICINE

## 2023-11-20 ASSESSMENT — ENCOUNTER SYMPTOMS
SNORING: 1
WHEEZING: 0
RIGHT EYE: 0
SHORTNESS OF BREATH: 0
HEMATOCHEZIA: 0
LEFT EYE: 0
STRIDOR: 0
HEMATEMESIS: 0

## 2023-11-20 NOTE — PATIENT INSTRUCTIONS
Plan:     Referral for sleep apnea testing. One week cardiac event monitor in one month to assess PVC burden. Continue taking current cardiac medication as prescribed. Follow up with me in 4 months.

## 2024-01-03 ENCOUNTER — NURSE ONLY (OUTPATIENT)
Dept: CARDIOLOGY CLINIC | Age: 39
End: 2024-01-03

## 2024-01-03 DIAGNOSIS — I49.3 PVC'S (PREMATURE VENTRICULAR CONTRACTIONS): Primary | ICD-10-CM

## 2024-01-03 NOTE — PROGRESS NOTES
Monitor placed by [employer]  Monitor company [employer]  Length of monitor 7 days  Monitor ordered by [facility]  Serial number [id number]  Activation successful prior to pt leaving office? Yes      Detail Level: Simple Plan: If no improvement to symptoms in one month, patient will return for a punch biopsy

## 2024-01-11 ENCOUNTER — TELEPHONE (OUTPATIENT)
Dept: CARDIOLOGY CLINIC | Age: 39
End: 2024-01-11

## 2024-01-11 NOTE — TELEPHONE ENCOUNTER
Linda Saleem MA  Medical Assistant     Progress Notes     Signed     Encounter Date: 1/3/2024     Signed         Monitor placed by [facility]  Monitor company [employer]  Length of monitor 7 days  Monitor ordered by [facility]  Serial number [id number]  Activation successful prior to pt leaving office? Yes

## 2024-01-15 ENCOUNTER — TELEPHONE (OUTPATIENT)
Dept: CARDIOLOGY CLINIC | Age: 39
End: 2024-01-15

## 2024-01-15 DIAGNOSIS — I49.3 PVC'S (PREMATURE VENTRICULAR CONTRACTIONS): Primary | ICD-10-CM

## 2024-01-15 NOTE — TELEPHONE ENCOUNTER
I spoke with the patient and relayed cardiac event monitor results. 18% PVC burden. Per [facility]  - patient to start Diltiazem 120 mg SR daily. RX pending for signature.

## 2024-02-01 DIAGNOSIS — R94.31 ABNORMAL EKG: ICD-10-CM

## 2024-02-01 DIAGNOSIS — I49.3 PVC'S (PREMATURE VENTRICULAR CONTRACTIONS): Primary | ICD-10-CM

## 2024-02-20 ASSESSMENT — ENCOUNTER SYMPTOMS
HEMATEMESIS: 0
RIGHT EYE: 0
WHEEZING: 0
SNORING: 1
STRIDOR: 0
SHORTNESS OF BREATH: 0
HEMATOCHEZIA: 0
LEFT EYE: 0

## 2024-02-20 NOTE — PROGRESS NOTES
Assessment:     1. Frequent PVC's: patient with occasional palpitations over a few months. Wore a Holter monitor (2 days) which showed a 21% burden of unifocal PVC's.     PVC morphology is unifocal with a left bundle branch block pattern and a left inferior axis. Precordial transition is between V3 and V4 possibly indicating a likely right outflow tract origin. A PVC burden above 10-15% can, at times, cause cardiac abnormalities such as PVC-mediated cardiomyopathy. This patient has normal overall cardiac structure and function by echocardiography. She has no personal history of syncope or near-syncope. No family history of sudden cardiac death. Resting ECG is unremarkable.    Did not have much in terms of palpitations or related symptoms. Was started on propranolol and felt better in retrospect. We continued beta-blocker and did a more extended event monitor (one week) to assess residual burden of PVC's. It showed 18% PVC burden. After that, we started her on verapamil 120 mg daily which has caused her lightheadedness. No PVC's on exam and one PVC on ECG today. She does not wish to continue verapamil. Unclear if burden decreased on combination of two medications.     After discussions of all options today, including clinical monitoring, pharmacologic alterations and invasive ablation, patient prefers the ablation options. Risks, benefits, alternatives and description of procedure discussed with patient in details.  Will go ahead and stop verapamil and continue beta-blocker for now. Will hold beta-blocker 5 days prior to procedure.     Meanwhile, she was referred for sleep study to assess for LAST as it can cause different types of cardiac arrhythmias.     2. Good blood pressure control    3. Suspected obstructive sleep apnea: patient has suspected obstructive sleep apnea (based on symptoms and other factors). Given the presence of atrial fibrillation, I discussed with the patient the importance of getting formally

## 2024-02-21 ENCOUNTER — TELEPHONE (OUTPATIENT)
Dept: CARDIOLOGY CLINIC | Age: 39
End: 2024-02-21

## 2024-02-21 ENCOUNTER — OFFICE VISIT (OUTPATIENT)
Dept: CARDIOLOGY CLINIC | Age: 39
End: 2024-02-21
Payer: COMMERCIAL

## 2024-02-21 VITALS
HEART RATE: 77 BPM | BODY MASS INDEX: 36.46 KG/M2 | OXYGEN SATURATION: 98 % | HEIGHT: 63 IN | SYSTOLIC BLOOD PRESSURE: 118 MMHG | WEIGHT: 205.8 LBS | DIASTOLIC BLOOD PRESSURE: 76 MMHG

## 2024-02-21 DIAGNOSIS — R42 LIGHTHEADEDNESS: ICD-10-CM

## 2024-02-21 DIAGNOSIS — R42 DIZZINESS: ICD-10-CM

## 2024-02-21 DIAGNOSIS — Z51.81 ENCOUNTER FOR MONITORING CALCIUM CHANNEL BLOCKER THERAPY: ICD-10-CM

## 2024-02-21 DIAGNOSIS — Z79.899 ENCOUNTER FOR MONITORING CALCIUM CHANNEL BLOCKER THERAPY: ICD-10-CM

## 2024-02-21 DIAGNOSIS — I49.3 PVC'S (PREMATURE VENTRICULAR CONTRACTIONS): Primary | ICD-10-CM

## 2024-02-21 DIAGNOSIS — I49.3 FREQUENT PVCS: ICD-10-CM

## 2024-02-21 PROCEDURE — 99214 OFFICE O/P EST MOD 30 MIN: CPT | Performed by: INTERNAL MEDICINE

## 2024-02-21 PROCEDURE — 93000 ELECTROCARDIOGRAM COMPLETE: CPT | Performed by: INTERNAL MEDICINE

## 2024-02-21 NOTE — PATIENT INSTRUCTIONS
Plan:     Proceed with sleep apnea testing.   Discussed further treatment options:   Continue current treatment course  Discussed the risks and benefits of an EP study and catheter ablation (literature provided)   Hold Propranolol 5 days prior to the procedure   Stop Verapamil.   Follow up with me in after the procedure.

## 2024-02-21 NOTE — TELEPHONE ENCOUNTER
[facility]  EP Procedure Sheet    02/21/2024  Kaylie Lopez  1985  [id number]  EP Procedures  [] Pacemaker implant (single/dual) [x] EP Study   [] ICD implant (single/dual) [] Atrial flutter ablation (MÓNICA Y/N)   [] Biv implant ICD [] Tilt Table   [] Biv implant PPM [] Atrial fibrillation ablation (MÓNICA Yes)   [] Generator Change (PPM/ICD/BiV) [] SVT ablation   [] Lead revision (RV/LA/RA) (<1 month) [x] PVC ablation     [] Lead extraction +/- upgrade (BiV/PPM/ICD) [] VT Ischemic/ non-ischemic   [] Loop implant/ removal [] VT RVOT   [] Cardioversion [] VT Left sided   [] MÓNICA [] AVN ablation   Equipment  [] Medtronic  [x] CASTRO Mapping System    [] Precision [x] Ensite X   [] [employer] [] Carto Mapping System   [] [employer] [] CryoAblation   [] Biotronik [] Laser Lead Extraction   EP Procedures Scheduling Request  # hours Requested  []1 []2 [x]2-4 [] 4-6 Scheduled  Date:   Specific Day  Completed    Anesthesia [x]yes []no F/u Date:   CT surgery backup []yes [x]no Location []FF[]AND   Overnight stay   []KW[]Meño Albert MD []RMM []MXA []MW  []UL []CMV  []WW [] RWH   [x]KA [] JMB [] AJK  First vs repeat   []1st [] 2nd [] 3rd   Pre-Procedure Labs / Imaging  [] PT/INR [] Type & cross   [x] CBC [] Units PRBC   [x] BMP/Mg [] Units FFP   [] Venogram [] Cardiac CTA for Pulmonary vein mapping     RN INITIALS: RNJC    Patient Instructions  Dx:PVC  ICD-10 code: I49.3  Medication Instructions: Hold []Xarelto []Eliquis []Coumadin []pradaxa for   Do not eat or drink after midnight the night prior to procedure [x] Yes [] No    Medications to hold (Will need reviewed with patient after scheduling):  Hold Propranolol 5 days prior to the procedure

## 2024-02-26 NOTE — TELEPHONE ENCOUNTER
Procedure:  EPS/PVC Ablation  Doctor:  Dr. Kingston  Date:  4/4/24  Time:  8am  Arrival:  6:30am  Reps:  Brissa X  Anesthesia:  Yes      Spoke with patient. Please have patient arrive to the main entrance of [facility] ([address] [place] [id number]) and check in with the registration desk.  They will be directed to the Cath Lab.  Please call patient regarding medication instructions. Remind patient to be NPO after midnight (8 hours prior). Do not apply lotions/creams on skin the day of procedure.    Instructions emailed.

## 2024-03-22 NOTE — TELEPHONE ENCOUNTER
Spoke with patient and relayed medication instructions prior to procedure. She V/U.     Medications to hold   Hold Propranolol 5 days prior to the procedure

## 2024-04-01 RX ORDER — PROPRANOLOL HCL 60 MG
CAPSULE, EXTENDED RELEASE 24HR ORAL DAILY
Qty: 90 CAPSULE | Refills: 2 | Status: ON HOLD | OUTPATIENT
Start: 2024-04-01 | End: 2024-04-05 | Stop reason: HOSPADM

## 2024-04-04 ENCOUNTER — ANESTHESIA (OUTPATIENT)
Dept: CARDIAC CATH/INVASIVE PROCEDURES | Age: 39
End: 2024-04-04
Payer: COMMERCIAL

## 2024-04-04 ENCOUNTER — ANESTHESIA EVENT (OUTPATIENT)
Dept: CARDIAC CATH/INVASIVE PROCEDURES | Age: 39
End: 2024-04-04
Payer: COMMERCIAL

## 2024-04-04 ENCOUNTER — HOSPITAL ENCOUNTER (INPATIENT)
Dept: CARDIAC CATH/INVASIVE PROCEDURES | Age: 39
LOS: 1 days | Discharge: HOME | End: 2024-04-05
Attending: INTERNAL MEDICINE | Admitting: INTERNAL MEDICINE
Payer: COMMERCIAL

## 2024-04-04 LAB
ANION GAP SERPL CALCULATED.3IONS-SCNC: 16 MMOL/L (ref 3–16)
BUN SERPL-MCNC: 9 MG/DL (ref 7–20)
CALCIUM SERPL-MCNC: 9.3 MG/DL (ref 8.3–10.6)
CHLORIDE SERPL-SCNC: 102 MMOL/L (ref 99–110)
CO2 SERPL-SCNC: 18 MMOL/L (ref 21–32)
CREAT SERPL-MCNC: 0.6 MG/DL (ref 0.6–1.1)
DEPRECATED RDW RBC AUTO: 15.4 % (ref 12.4–15.4)
EKG ATRIAL RATE: 104 BPM
EKG ATRIAL RATE: 89 BPM
EKG DIAGNOSIS: NORMAL
EKG DIAGNOSIS: NORMAL
EKG P AXIS: 46 DEGREES
EKG P AXIS: 55 DEGREES
EKG P-R INTERVAL: 158 MS
EKG P-R INTERVAL: 160 MS
EKG Q-T INTERVAL: 366 MS
EKG Q-T INTERVAL: 414 MS
EKG QRS DURATION: 134 MS
EKG QRS DURATION: 90 MS
EKG QTC CALCULATION (BAZETT): 481 MS
EKG QTC CALCULATION (BAZETT): 503 MS
EKG R AXIS: 31 DEGREES
EKG R AXIS: 63 DEGREES
EKG T AXIS: -2 DEGREES
EKG T AXIS: 17 DEGREES
EKG VENTRICULAR RATE: 104 BPM
EKG VENTRICULAR RATE: 89 BPM
GFR SERPLBLD CREATININE-BSD FMLA CKD-EPI: >90 ML/MIN/{1.73_M2}
GLUCOSE SERPL-MCNC: 118 MG/DL (ref 70–99)
HCG UR QL: NEGATIVE
HCT VFR BLD AUTO: 40.2 % (ref 36–48)
HGB BLD-MCNC: 13.7 G/DL (ref 12–16)
MAGNESIUM SERPL-MCNC: 1.9 MG/DL (ref 1.8–2.4)
MCH RBC QN AUTO: 28.3 PG (ref 26–34)
MCHC RBC AUTO-ENTMCNC: 34.2 G/DL (ref 31–36)
MCV RBC AUTO: 82.8 FL (ref 80–100)
PLATELET # BLD AUTO: 216 K/UL (ref 135–450)
PMV BLD AUTO: 9 FL (ref 5–10.5)
POC ACT LR: 157 SEC
POTASSIUM SERPL-SCNC: 3.5 MMOL/L (ref 3.5–5.1)
RBC # BLD AUTO: 4.85 M/UL (ref 4–5.2)
SODIUM SERPL-SCNC: 136 MMOL/L (ref 136–145)
SPECIMEN STATUS: NORMAL
WBC # BLD AUTO: 8.8 K/UL (ref 4–11)

## 2024-04-04 PROCEDURE — 6360000002 HC RX W HCPCS: Performed by: NURSE ANESTHETIST, CERTIFIED REGISTERED

## 2024-04-04 PROCEDURE — 93010 ELECTROCARDIOGRAM REPORT: CPT | Performed by: INTERNAL MEDICINE

## 2024-04-04 PROCEDURE — C2630 CATH, EP, COOL-TIP: HCPCS

## 2024-04-04 PROCEDURE — C1730 CATH, EP, 19 OR FEW ELECT: HCPCS

## 2024-04-04 PROCEDURE — 4A023FZ MEASUREMENT OF CARDIAC RHYTHM, PERCUTANEOUS APPROACH: ICD-10-PCS | Performed by: INTERNAL MEDICINE

## 2024-04-04 PROCEDURE — 2580000003 HC RX 258

## 2024-04-04 PROCEDURE — 2709999900 HC NON-CHARGEABLE SUPPLY

## 2024-04-04 PROCEDURE — 6360000002 HC RX W HCPCS

## 2024-04-04 PROCEDURE — 80048 BASIC METABOLIC PNL TOTAL CA: CPT

## 2024-04-04 PROCEDURE — 2500000003 HC RX 250 WO HCPCS

## 2024-04-04 PROCEDURE — 93654 COMPRE EP EVAL TX VT: CPT

## 2024-04-04 PROCEDURE — 93623 PRGRMD STIMJ&PACG IV RX NFS: CPT | Performed by: INTERNAL MEDICINE

## 2024-04-04 PROCEDURE — 93005 ELECTROCARDIOGRAM TRACING: CPT | Performed by: INTERNAL MEDICINE

## 2024-04-04 PROCEDURE — 02K83ZZ MAP CONDUCTION MECHANISM, PERCUTANEOUS APPROACH: ICD-10-PCS | Performed by: INTERNAL MEDICINE

## 2024-04-04 PROCEDURE — C1894 INTRO/SHEATH, NON-LASER: HCPCS

## 2024-04-04 PROCEDURE — 85027 COMPLETE CBC AUTOMATED: CPT

## 2024-04-04 PROCEDURE — 83735 ASSAY OF MAGNESIUM: CPT

## 2024-04-04 PROCEDURE — 3700000000 HC ANESTHESIA ATTENDED CARE: Performed by: ANESTHESIOLOGY

## 2024-04-04 PROCEDURE — 3700000001 HC ADD 15 MINUTES (ANESTHESIA): Performed by: ANESTHESIOLOGY

## 2024-04-04 PROCEDURE — C1760 CLOSURE DEV, VASC: HCPCS | Performed by: INTERNAL MEDICINE

## 2024-04-04 PROCEDURE — 2580000003 HC RX 258: Performed by: NURSE ANESTHETIST, CERTIFIED REGISTERED

## 2024-04-04 PROCEDURE — C1893 INTRO/SHEATH, FIXED,NON-PEEL: HCPCS

## 2024-04-04 PROCEDURE — 85347 COAGULATION TIME ACTIVATED: CPT

## 2024-04-04 PROCEDURE — 2580000003 HC RX 258: Performed by: INTERNAL MEDICINE

## 2024-04-04 PROCEDURE — 2060000000 HC ICU INTERMEDIATE R&B

## 2024-04-04 PROCEDURE — 02583ZZ DESTRUCTION OF CONDUCTION MECHANISM, PERCUTANEOUS APPROACH: ICD-10-PCS | Performed by: INTERNAL MEDICINE

## 2024-04-04 PROCEDURE — 84703 CHORIONIC GONADOTROPIN ASSAY: CPT

## 2024-04-04 PROCEDURE — 93654 COMPRE EP EVAL TX VT: CPT | Performed by: INTERNAL MEDICINE

## 2024-04-04 PROCEDURE — 2720000010 HC SURG SUPPLY STERILE: Performed by: INTERNAL MEDICINE

## 2024-04-04 PROCEDURE — 4A0234Z MEASUREMENT OF CARDIAC ELECTRICAL ACTIVITY, PERCUTANEOUS APPROACH: ICD-10-PCS | Performed by: INTERNAL MEDICINE

## 2024-04-04 RX ORDER — SODIUM CHLORIDE 0.9 % (FLUSH) 0.9 %
5-40 SYRINGE (ML) INJECTION EVERY 12 HOURS SCHEDULED
OUTPATIENT
Start: 2024-04-04

## 2024-04-04 RX ORDER — LORAZEPAM 0.5 MG/1
0.5 TABLET ORAL
Status: ACTIVE | OUTPATIENT
Start: 2024-04-04 | End: 2024-04-05

## 2024-04-04 RX ORDER — SODIUM CHLORIDE 0.9 % (FLUSH) 0.9 %
5-40 SYRINGE (ML) INJECTION EVERY 12 HOURS SCHEDULED
Status: DISCONTINUED | OUTPATIENT
Start: 2024-04-04 | End: 2024-04-05 | Stop reason: HOSPADM

## 2024-04-04 RX ORDER — HYDROMORPHONE HYDROCHLORIDE 1 MG/ML
0.25 INJECTION, SOLUTION INTRAMUSCULAR; INTRAVENOUS; SUBCUTANEOUS EVERY 5 MIN PRN
OUTPATIENT
Start: 2024-04-04

## 2024-04-04 RX ORDER — SODIUM CHLORIDE 9 MG/ML
INJECTION, SOLUTION INTRAVENOUS PRN
OUTPATIENT
Start: 2024-04-04

## 2024-04-04 RX ORDER — ACETAMINOPHEN 325 MG/1
650 TABLET ORAL EVERY 4 HOURS PRN
Status: DISCONTINUED | OUTPATIENT
Start: 2024-04-04 | End: 2024-04-05 | Stop reason: HOSPADM

## 2024-04-04 RX ORDER — PROPOFOL 10 MG/ML
INJECTION, EMULSION INTRAVENOUS CONTINUOUS PRN
Status: DISCONTINUED | OUTPATIENT
Start: 2024-04-04 | End: 2024-04-04

## 2024-04-04 RX ORDER — ONDANSETRON 2 MG/ML
4 INJECTION INTRAMUSCULAR; INTRAVENOUS EVERY 6 HOURS PRN
Status: DISCONTINUED | OUTPATIENT
Start: 2024-04-04 | End: 2024-04-05 | Stop reason: HOSPADM

## 2024-04-04 RX ORDER — PROPOFOL 10 MG/ML
INJECTION, EMULSION INTRAVENOUS PRN
Status: DISCONTINUED | OUTPATIENT
Start: 2024-04-04 | End: 2024-04-04 | Stop reason: SDUPTHER

## 2024-04-04 RX ORDER — ONDANSETRON 2 MG/ML
4 INJECTION INTRAMUSCULAR; INTRAVENOUS
OUTPATIENT
Start: 2024-04-04 | End: 2024-04-05

## 2024-04-04 RX ORDER — DIPHENHYDRAMINE HYDROCHLORIDE 50 MG/ML
12.5 INJECTION INTRAMUSCULAR; INTRAVENOUS
OUTPATIENT
Start: 2024-04-04 | End: 2024-04-05

## 2024-04-04 RX ORDER — MEPERIDINE HYDROCHLORIDE 50 MG/ML
12.5 INJECTION INTRAMUSCULAR; INTRAVENOUS; SUBCUTANEOUS EVERY 5 MIN PRN
OUTPATIENT
Start: 2024-04-04

## 2024-04-04 RX ORDER — LABETALOL HYDROCHLORIDE 5 MG/ML
10 INJECTION, SOLUTION INTRAVENOUS
OUTPATIENT
Start: 2024-04-04

## 2024-04-04 RX ORDER — PROTAMINE SULFATE 10 MG/ML
INJECTION, SOLUTION INTRAVENOUS PRN
Status: DISCONTINUED | OUTPATIENT
Start: 2024-04-04 | End: 2024-04-04 | Stop reason: SDUPTHER

## 2024-04-04 RX ORDER — SODIUM CHLORIDE 0.9 % (FLUSH) 0.9 %
5-40 SYRINGE (ML) INJECTION PRN
Status: DISCONTINUED | OUTPATIENT
Start: 2024-04-04 | End: 2024-04-05 | Stop reason: HOSPADM

## 2024-04-04 RX ORDER — SODIUM CHLORIDE 0.9 % (FLUSH) 0.9 %
5-40 SYRINGE (ML) INJECTION PRN
OUTPATIENT
Start: 2024-04-04

## 2024-04-04 RX ORDER — CETIRIZINE HYDROCHLORIDE 10 MG/1
10 TABLET ORAL DAILY
Status: DISCONTINUED | OUTPATIENT
Start: 2024-04-04 | End: 2024-04-05 | Stop reason: HOSPADM

## 2024-04-04 RX ORDER — ONDANSETRON 2 MG/ML
INJECTION INTRAMUSCULAR; INTRAVENOUS PRN
Status: DISCONTINUED | OUTPATIENT
Start: 2024-04-04 | End: 2024-04-04 | Stop reason: SDUPTHER

## 2024-04-04 RX ORDER — SODIUM CHLORIDE 9 MG/ML
INJECTION, SOLUTION INTRAVENOUS PRN
Status: DISCONTINUED | OUTPATIENT
Start: 2024-04-04 | End: 2024-04-05 | Stop reason: HOSPADM

## 2024-04-04 RX ORDER — FENTANYL CITRATE 50 UG/ML
INJECTION, SOLUTION INTRAMUSCULAR; INTRAVENOUS PRN
Status: DISCONTINUED | OUTPATIENT
Start: 2024-04-04 | End: 2024-04-04 | Stop reason: SDUPTHER

## 2024-04-04 RX ORDER — MIDAZOLAM HYDROCHLORIDE 1 MG/ML
INJECTION INTRAMUSCULAR; INTRAVENOUS PRN
Status: DISCONTINUED | OUTPATIENT
Start: 2024-04-04 | End: 2024-04-04 | Stop reason: SDUPTHER

## 2024-04-04 RX ORDER — SODIUM CHLORIDE 9 MG/ML
INJECTION, SOLUTION INTRAVENOUS CONTINUOUS PRN
Status: DISCONTINUED | OUTPATIENT
Start: 2024-04-04 | End: 2024-04-04 | Stop reason: SDUPTHER

## 2024-04-04 RX ORDER — NALOXONE HYDROCHLORIDE 0.4 MG/ML
INJECTION, SOLUTION INTRAMUSCULAR; INTRAVENOUS; SUBCUTANEOUS PRN
OUTPATIENT
Start: 2024-04-04

## 2024-04-04 RX ORDER — OXYCODONE HYDROCHLORIDE 5 MG/1
5 TABLET ORAL
OUTPATIENT
Start: 2024-04-04 | End: 2024-04-05

## 2024-04-04 RX ORDER — PROPOFOL 10 MG/ML
INJECTION, EMULSION INTRAVENOUS CONTINUOUS PRN
Status: DISCONTINUED | OUTPATIENT
Start: 2024-04-04 | End: 2024-04-04 | Stop reason: SDUPTHER

## 2024-04-04 RX ORDER — HEPARIN SODIUM 1000 [USP'U]/ML
INJECTION, SOLUTION INTRAVENOUS; SUBCUTANEOUS
Status: COMPLETED | OUTPATIENT
Start: 2024-04-04 | End: 2024-04-04

## 2024-04-04 RX ORDER — HYDROMORPHONE HYDROCHLORIDE 1 MG/ML
0.5 INJECTION, SOLUTION INTRAMUSCULAR; INTRAVENOUS; SUBCUTANEOUS EVERY 5 MIN PRN
OUTPATIENT
Start: 2024-04-04

## 2024-04-04 RX ADMIN — PROPOFOL 30 MG: 10 INJECTION, EMULSION INTRAVENOUS at 09:09

## 2024-04-04 RX ADMIN — FENTANYL CITRATE 25 MCG: 50 INJECTION, SOLUTION INTRAMUSCULAR; INTRAVENOUS at 11:10

## 2024-04-04 RX ADMIN — PROTAMINE SULFATE 5 MG: 10 INJECTION, SOLUTION INTRAVENOUS at 12:17

## 2024-04-04 RX ADMIN — FENTANYL CITRATE 50 MCG: 50 INJECTION, SOLUTION INTRAMUSCULAR; INTRAVENOUS at 09:02

## 2024-04-04 RX ADMIN — PROPOFOL 25 MCG/KG/MIN: 10 INJECTION, EMULSION INTRAVENOUS at 09:02

## 2024-04-04 RX ADMIN — PROTAMINE SULFATE 25 MG: 10 INJECTION, SOLUTION INTRAVENOUS at 12:21

## 2024-04-04 RX ADMIN — SODIUM CHLORIDE: 9 INJECTION, SOLUTION INTRAVENOUS at 08:48

## 2024-04-04 RX ADMIN — PROPOFOL 30 MG: 10 INJECTION, EMULSION INTRAVENOUS at 10:57

## 2024-04-04 RX ADMIN — SODIUM CHLORIDE, PRESERVATIVE FREE 10 ML: 5 INJECTION INTRAVENOUS at 21:41

## 2024-04-04 RX ADMIN — PROPOFOL 50 MG: 10 INJECTION, EMULSION INTRAVENOUS at 10:15

## 2024-04-04 RX ADMIN — HEPARIN SODIUM 13000 UNITS: 1000 INJECTION, SOLUTION INTRAVENOUS; SUBCUTANEOUS at 11:15

## 2024-04-04 RX ADMIN — SODIUM CHLORIDE: 9 INJECTION, SOLUTION INTRAVENOUS at 12:34

## 2024-04-04 RX ADMIN — MIDAZOLAM 2 MG: 1 INJECTION INTRAMUSCULAR; INTRAVENOUS at 08:59

## 2024-04-04 RX ADMIN — PROPOFOL 20 MG: 10 INJECTION, EMULSION INTRAVENOUS at 09:02

## 2024-04-04 RX ADMIN — ONDANSETRON 4 MG: 2 INJECTION INTRAMUSCULAR; INTRAVENOUS at 12:27

## 2024-04-04 RX ADMIN — FENTANYL CITRATE 25 MCG: 50 INJECTION, SOLUTION INTRAMUSCULAR; INTRAVENOUS at 10:40

## 2024-04-04 RX ADMIN — PROPOFOL 40 MG: 10 INJECTION, EMULSION INTRAVENOUS at 10:43

## 2024-04-04 ASSESSMENT — ENCOUNTER SYMPTOMS
SHORTNESS OF BREATH: 0
SNORING: 1

## 2024-04-04 NOTE — ANESTHESIA POSTPROCEDURE EVALUATION
Department of Anesthesiology  Postprocedure Note    Patient: Kaylie Lopez  MRN: [id number]  YOB: 1985  Date of evaluation: 4/4/2024    Procedure Summary       Date: 04/04/24 Room / Location: [facility] Cardiac Cath Lab    Anesthesia Start: 0848 Anesthesia Stop: 1300    Procedure: ABLATION Diagnosis:       Ventricular premature depolarization      Frequent PVCs    Scheduled Providers:  Responsible Provider: En Huerta MD    Anesthesia Type: MAC ASA Status: 2            Anesthesia Type: No value filed.    Olena Phase I:      Olena Phase II:      Anesthesia Post Evaluation    Patient location during evaluation: PACU  Patient participation: complete - patient participated  Level of consciousness: awake and alert  Airway patency: patent  Nausea & Vomiting: no vomiting and no nausea  Cardiovascular status: hemodynamically stable and blood pressure returned to baseline  Respiratory status: acceptable  Hydration status: euvolemic  Pain management: adequate    No notable events documented.

## 2024-04-04 NOTE — CARE COORDINATION
Chart reviewed. Patient has insurance with [employer] and her spouse is her primary contact. She has a PCP listed. She an EP study and ablation. Following for any barriers to discharge but anticipate no needs.

## 2024-04-04 NOTE — DISCHARGE INSTRUCTIONS
Cath Lab at  [facility]  Discharge Instructions        4/4/2024  Kaylie Lopez   Date of Birth 1985     Activity:  No driving for 24 hours.  No Tub baths, swimming or hot tubs for 5 days.  In 24 hours you may remove dressing and shower.  Wash site with soap and water and leave open to air  No lotions, powders or ointments near site for 5 days.   No lifting over 5 pounds for 5 days.  Return to work/school in 5 days unless instructed otherwise by your doctor.    Diet:  Resume previous diet unless instructed otherwise by your doctor, if so general guidelines for a cardiac diet will be provided to you.   Drink extra non-alcoholic/decaffienated fluids for first 24 hours after your procedure.    Groin Management:  If you have stairs to walk up, pretend you have a cast on the affected leg walk up them without bending affected leg.  When you go home go to the bed or sofa, do not get up except to go to the bathroom.  Avoid strenuous activity for 5 days. For example, lifting heavy objects greater than 10 lbs, bicycling, jogging, climbing stairs, or walking long distances.  If bleeding occurs from the site or a hematoma (lump), begins to increase in size, apply pressure directly over the site and call 911 to return to the hospital.     Special Instructions:  Report any coolness or numbness in the leg where the cath was done to the MD or call 911  Report any chills, fever, itching, red bumps or rash.  Report any of the following to the MD: drainage from the cath site, redness and/or swelling at the site, increased tenderness at the site.  If you are currently taking Metformin or Metformin combination medications for Diabetes, hold your dose for 48 hours after your procedure.  Do not stop taking Plavix, Brilinta or Effient, without first consulting your cardiologist.      Sedation Discharge Instructions:  For the next 24 hours do not drive a car, operate machinery, power tools or kitchen  appliances.  Do not drink alcohol; including beer or wine.  Do not make any important decisions or sign any important papers.  For the next 24 hours you can expect drowsiness, light-headed or dizziness, nausea/ vomiting, inability to concentrate, fatigue and desire to sleep.  We strongly suggest that a responsible adult be with for the next 24 hours, for your protection and safety.  You are not allowed to drive yourself home, or take any type of public transportation.      Cardiac Rehab: If your doctor recommends Cardiac rehab, you will receive a call from that department to schedule your 1st appointment.    Your Care Instructions  There are many things that cause chest pain. Some are not serious and will get better on their own in a few days. But some kinds of chest pain need more testing and treatment. Your doctor may have recommended follow-up visit in the next 4 to 8 weeks. If you are not feeling better, you may need more tests or treatment. Even though your doctor has released you, you still need to watch for any problems. The doctor carefully checked you, but sometimes problems can develop later. If you have new symptoms or if your symptoms do not improve, get medical help right away. If you have worse or different chest pain or pressure that lasts more than 5 minutes or you passed out (lost consciouess), call 911 or seek other emergency help right away.  A medical visit is only one step in your treatment. Even if you feel better, you still need to do what your doctor recommends, such as going to all suggested follow-up appointments and taking medications exactly as directed. This will help you recover and help prevent future problems.  How can you care for yourself at home?  Rest until you feel better.  Take your medicine exactly as prescribed. Call your doctor if you think you are having problems with your medicine.  Do not drive after taking a prescription pain medication.  When should you call for

## 2024-04-04 NOTE — PROCEDURES
Ablation Location: RVOT    3. Left femoral vein:         Sheath size: 7 F  Catheter type: Quadripolar Location: His Bundle area    4. Left femoral vein:         Sheath size: 6 F  Catheter type: Decapolar  Location:  coronary sinus    4. Right femoral artery:         Sheath size: 8 F  Catheter type: HD Grid  Location: LVOT and coronary cusp area    Baseline parameters (ms)          LA interval: 171  QRS duration: 80  QT interval: 330            Baseline cycle length: 598    AH interval: 77  HV interval: 51         Atrioventricular mumtaz function:  Incremental pacing was performed from the high right atrium and right ventricular apex and the antegrade and retrograde atrioventricular (AV) Wenckebach cycle length was determined.    Antegrade AV Wenckebach was 300 ms.  Retrograde AV Wenckebach was 470 ms.    Single atrial extrastimuli were delivered following drivetrains of 600ms and 450ms from the high right atrium and the AV mumtaz effective refractory period (ERP) was determined.  Evidence of dual AV mumtaz pathways was seen (AH jump and single AV mumtaz echo beats).    AVN ERP:   600 ms / <220 msec    450 ms / <200 msec    Atrial ERP:  600 ms / 220 msec       450 ms / 200 msec      Ventricular function:     Single ventricular extrastimuli were delivered following drivetrains of 600ms and 450ms from the right ventricular apex and the ventricular ERP was determined.      Ventricular ERP   600 ms / 250 msec   450 ms/  240 msec    VA ERP   600 ms / <250 msec       Electroanatomical Mapping:  An activation map of the right ventricle was created during sinus rhythm using the [facility] Flowbox 3D electro-anatomic mapping system. The map was marked for the location of the His Bundle electrogram, the coronary sinus ostium and the pulmonic valve. A template of the clinical PVC was recorded. A location was identified along the posterior aspect of the RVOT where activation mapping (local activation >30 msec prior to surface QRS of  PVC); excellent unipolar QS signal and a >95% pacemap).    Ablation  Following the identification of the suspected arrhythmogenic focus, the ablation catheter was advanced into position under 3D mapping system guidance. An RF lesion was delivered (30W, 15 seconds) with disappearance of the clinical PVC's. Additional lesions were delivered in the vicinity of the original RF lesion. The PVC's recurred multiple times. Decision was made to map the adjacent LVOT and coronary cusp area.    Mapping of LVOT and coronary cusps:  After administration of heparin, the HD Grid catheter was advanced into the LVOT and coronary cusp areas. Mapping of the PVC in those areas demonstrated lack of any early activation areas. Nothing earlier than RVOT was found.    Further mapping of the PVC was performed in the RVOT area. It indicated early signal from the same area of the original ablation. Decision made to proceed with longer ablation lesions (with higher power) in that area.     Ablation  An RF lesion was delivered (35W, 30 seconds) with disappearance of the clinical PVC's. Additional lesions were delivered in the vicinity of the original RF lesion. No recurrence of the arrhythmia was noted afterwards.     Drug Infusion and Observation:  Following ablation, programmed stimulation and observation was performed on and off of isoproterenol infusion with demonstration of no spontaneous clinical PVC's in contrast to prior to the ablation.       Following an appropriate waiting period during which success of the ablation was confirmed, catheters were removed. Protamine 30 mg was administered to reverse heparin anticoagulation. Sheaths were removed and hemostasis achieved with use of the Vascade venous closure devices (as per usual protocol) followed by manual compression.     The patient was transported to the holding area in stable condition.    Summary:  Comprehensive electrophysiologic study with mapping of PVC focus  Successful

## 2024-04-04 NOTE — H&P
Cardiac Electrophysiology H&P Note      Assessment:     1. Frequent PVC's: patient with occasional palpitations over a few months. Wore a Holter monitor (2 days) which showed a 21% burden of unifocal PVC's.      PVC morphology is unifocal with a left bundle branch block pattern and a left inferior axis. Precordial transition is between V3 and V4 possibly indicating a likely right outflow tract origin. A PVC burden above 10-15% can, at times, cause cardiac abnormalities such as PVC-mediated cardiomyopathy. This patient has normal overall cardiac structure and function by echocardiography. She has no personal history of syncope or near-syncope. No family history of sudden cardiac death. Resting ECG is unremarkable.     Did not have much in terms of palpitations or related symptoms. Was started on propranolol and felt better in retrospect. We continued beta-blocker and did a more extended event monitor (one week) to assess residual burden of PVC's. It showed 18% PVC burden. After that, we started her on verapamil 120 mg daily which has caused her lightheadedness. No PVC's on exam and one PVC on ECG today. She does not wish to continue verapamil. Unclear if burden decreased on combination of two medications.      After discussions of all options today, including clinical monitoring, pharmacologic alterations and invasive ablation, patient prefers the ablation options. Risks, benefits, alternatives and description of procedure discussed with patient in details. Plan to proceed with ablation.     2. Good blood pressure control     3. Suspected obstructive sleep apnea: patient has suspected obstructive sleep apnea (based on symptoms and other factors). Given the presence of atrial fibrillation, I discussed with the patient the importance of getting formally tested for obstructive sleep apnea as part of the management of atrial fibrillation. If significant obstructive sleep apnea is found, then treatment would be very  \"PROTIME\" in the last 72 hours.    No results for input(s): \"CKMB\", \"TROPONINI\" in the last 72 hours.    No results for input(s): \"PROBNP\" in the last 72 hours.        Signed by: Hilaria Kingston MD

## 2024-04-04 NOTE — ANESTHESIA PRE PROCEDURE
Department of Anesthesiology  Preprocedure Note       Name:  Kaylie Lopez   Age:  38 y.o.  :  1985                                          MRN:  [id number]         Date:  2024      Surgeon: * Surgery not found *    Procedure:     Medications prior to admission:   Prior to Admission medications    Medication Sig Start Date End Date Taking? Authorizing Provider   propranolol (INDERAL LA) 60 MG extended release capsule TAKE 1 CAPSULE BY MOUTH EVERY DAY 24   Humza Hi DO   cetirizine (ZYRTEC) 10 MG tablet Take 1 tablet by mouth daily    Provider, MD Junior       Current medications:    Current Facility-Administered Medications   Medication Dose Route Frequency Provider Last Rate Last Admin   • sodium chloride flush 0.9 % injection 5-40 mL  5-40 mL IntraVENous 2 times per day Hilaria Kingston MD       • sodium chloride flush 0.9 % injection 5-40 mL  5-40 mL IntraVENous PRN Hilaria Kingston MD       • 0.9 % sodium chloride infusion   IntraVENous PRN Hilaria Kingston MD       • LORazepam (ATIVAN) tablet 0.5 mg  0.5 mg Oral Once PRN Hilaria Kingston MD       • ondansetron (ZOFRAN) injection 4 mg  4 mg IntraVENous Q6H PRN Hilaria Kingston MD           Allergies:    Allergies   Allergen Reactions   • Kiwi Extract Swelling     Tongue swelling   • Other      Artifical cinnamon         Problem List:    Patient Active Problem List   Diagnosis Code   • S/P laparoscopy Z98.890   • PVC's (premature ventricular contractions) I49.3   • Abnormal EKG R94.31   • Lightheadedness R42       Past Medical History:        Diagnosis Date   • Abnormal Pap smear of cervix     HPV   • Allergic rhinitis    • Fracture, toe     4th toe left foot   • Hypercholesterolemia     isolated; managed with diet   • Mental disorder     anxiety, depression - no meds       Past Surgical History:        Procedure Laterality Date   • ANKLE SURGERY  multiple from 3/01-    left   • COLPOSCOPY     • LAPAROSCOPY

## 2024-04-04 NOTE — DISCHARGE SUMMARY
Patient ID:  Kaylie Lopez  [id number]  38 y.o.  1985    Admit date: 4/4/2024    Discharge date and time: 4/5/2024    Admitting Physician: Hilaria Kingston MD     Discharge NP: Dinah BLUE-CNP    Admission Diagnoses: Ventricular premature depolarization [I49.3]  Frequent PVCs [I49.3]    Discharge Diagnoses: SAME    Admission Condition: fair    Discharged Condition: good    Hospital Course: Kaylie Lopez was admitted on 4/4/2024 and had EPS and ablation of PVCs originating from posterior aspect of the RVOT. Rhythm has been SR. Denies chest pain, shortness of breath and palpitations. Has eaten, ambulated, and voided.     Consults: None    Assessment:   Frequent PVCs   -EPS and ablation of PVCs originating from posterior aspect of the RVOT 4/4/2024    Suspected sleep apnea    Plan:   Continue Zyrtec as ordered    Post procedure instructions reviewed  Follow up in office on 5/8/2024 with Dr. Kingston      Discharge Exam:  /79   Pulse 92   Temp 98.9 °F (37.2 °C) (Oral)   Resp 18   Ht 1.6 m (5' 3\")   Wt 92.5 kg (204 lb)   SpO2 96%   BMI 36.14 kg/m²     General Appearance:    Alert, cooperative, no distress, appears stated age   Head:    Normocephalic, without obvious abnormality, atraumatic   Eyes:    PERRL, conjunctiva/corneas clear, EOM's intact        Ears:    deferred   Nose:   Nares normal, septum midline, mucosa normal, no drainage    or sinus tenderness   Throat:   Lips, mucosa, and tongue normal; teeth and gums normal   Neck:   Supple, symmetrical, trachea midline, no adenopathy;        thyroid:  No enlargement/tenderness/nodules; no carotid    bruit or JVD   Back:     Symmetric, no curvature, ROM normal, no CVA tenderness   Lungs:     Clear to auscultation bilaterally, respirations unlabored   Chest wall:    No tenderness or deformity   Heart:    Regular rate and rhythm, S1 and S2 normal, no murmur, rub   or gallop; NSR  on tele   Abdomen:     Soft, non-tender, bowel sounds

## 2024-04-04 NOTE — PROGRESS NOTES
Right arterial sheath out @ 1333 (*BED REST UNTIL 1933*) . New gauze and tegaderm applied. Voided 900ml urine prior. Pressure held x25 mins. Site soft/dry.   Bilateral venous groin sites with vascade; remain soft and dry.   Bilateral pedal pulses +2.   VSS on room air. Remained sinus rhythm.

## 2024-04-05 VITALS
HEART RATE: 92 BPM | HEIGHT: 63 IN | BODY MASS INDEX: 36.14 KG/M2 | DIASTOLIC BLOOD PRESSURE: 79 MMHG | WEIGHT: 204 LBS | SYSTOLIC BLOOD PRESSURE: 113 MMHG | TEMPERATURE: 98.9 F | RESPIRATION RATE: 18 BRPM | OXYGEN SATURATION: 96 %

## 2024-04-05 LAB
ANION GAP SERPL CALCULATED.3IONS-SCNC: 13 MMOL/L (ref 3–16)
BUN SERPL-MCNC: 9 MG/DL (ref 7–20)
CALCIUM SERPL-MCNC: 8.7 MG/DL (ref 8.3–10.6)
CHLORIDE SERPL-SCNC: 103 MMOL/L (ref 99–110)
CO2 SERPL-SCNC: 19 MMOL/L (ref 21–32)
CREAT SERPL-MCNC: 0.6 MG/DL (ref 0.6–1.1)
DEPRECATED RDW RBC AUTO: 15.3 % (ref 12.4–15.4)
GFR SERPLBLD CREATININE-BSD FMLA CKD-EPI: >90 ML/MIN/{1.73_M2}
GLUCOSE BLD-MCNC: 119 MG/DL (ref 70–99)
GLUCOSE SERPL-MCNC: 107 MG/DL (ref 70–99)
HCT VFR BLD AUTO: 36 % (ref 36–48)
HGB BLD-MCNC: 12.3 G/DL (ref 12–16)
MCH RBC QN AUTO: 28.7 PG (ref 26–34)
MCHC RBC AUTO-ENTMCNC: 34.2 G/DL (ref 31–36)
MCV RBC AUTO: 83.9 FL (ref 80–100)
PERFORMED ON: ABNORMAL
PLATELET # BLD AUTO: 183 K/UL (ref 135–450)
PMV BLD AUTO: 9.3 FL (ref 5–10.5)
POC ACT LR: 283 SEC
POTASSIUM SERPL-SCNC: 3.8 MMOL/L (ref 3.5–5.1)
RBC # BLD AUTO: 4.29 M/UL (ref 4–5.2)
SODIUM SERPL-SCNC: 135 MMOL/L (ref 136–145)
WBC # BLD AUTO: 7.9 K/UL (ref 4–11)

## 2024-04-05 PROCEDURE — 80048 BASIC METABOLIC PNL TOTAL CA: CPT

## 2024-04-05 PROCEDURE — 85027 COMPLETE CBC AUTOMATED: CPT

## 2024-04-05 PROCEDURE — 99239 HOSP IP/OBS DSCHRG MGMT >30: CPT

## 2024-04-05 PROCEDURE — 36415 COLL VENOUS BLD VENIPUNCTURE: CPT

## 2024-04-05 ASSESSMENT — PAIN DESCRIPTION - ORIENTATION: ORIENTATION: RIGHT;OTHER (COMMENT)

## 2024-04-05 ASSESSMENT — PAIN SCALES - GENERAL: PAINLEVEL_OUTOF10: 3

## 2024-04-05 ASSESSMENT — PAIN DESCRIPTION - LOCATION: LOCATION: GROIN

## 2024-04-05 NOTE — PLAN OF CARE
Problem: Discharge Planning  Goal: Discharge to home or other facility with appropriate resources  4/5/2024 1116 by Kimi Lay RN  Outcome: Progressing  4/4/2024 2207 by Janis Adler RN  Outcome: Progressing     Problem: Safety - Adult  Goal: Free from fall injury  4/5/2024 1116 by Kimi Lay RN  Outcome: Progressing  4/4/2024 2207 by Janis Adler RN  Outcome: Progressing     Problem: Pain  Goal: Verbalizes/displays adequate comfort level or baseline comfort level  Outcome: Progressing

## 2024-04-05 NOTE — PROGRESS NOTES
Patient discharge completed. IV and tele removed. Discharge information included information on diagnosis including signs and symptoms, complications and when to seek medical attention. Information on new medications also provided included use for the medication, side effects and when to call the doctor. Patient verbalized understanding of all discharge information. Patient escorted out by staff with all documented belongings. Home with family.

## 2024-04-06 ENCOUNTER — PATIENT MESSAGE (OUTPATIENT)
Dept: CARDIOLOGY CLINIC | Age: 39
End: 2024-04-06

## 2024-04-08 ENCOUNTER — TELEPHONE (OUTPATIENT)
Dept: CARDIOLOGY CLINIC | Age: 39
End: 2024-04-08

## 2024-04-08 ENCOUNTER — TELEPHONE (OUTPATIENT)
Dept: FAMILY MEDICINE CLINIC | Age: 39
End: 2024-04-08

## 2024-04-08 NOTE — TELEPHONE ENCOUNTER
Pt contacted office states there is a lump where the sheath was placed. Pt states its not \"super firm\" but would like to know if she needs to be seen.

## 2024-04-08 NOTE — TELEPHONE ENCOUNTER
Care Transitions Initial Follow Up Call    Outreach made within 2 business days of discharge: Yes    Patient: Kaylie Lopez Patient : 1985   MRN: [id number]  Reason for Admission: There are no discharge diagnoses documented for the most recent discharge.  Discharge Date: 24       Spoke with: Kaylie Lopez    Discharge department/facility: [facility] Interactive Patient Contact:  Was patient able to fill all prescriptions: Yes  Was patient instructed to bring all medications to the follow-up visit: Yes  Is patient taking all medications as directed in the discharge summary? Yes  Does patient understand their discharge instructions: Yes  Does patient have questions or concerns that need addressed prior to 7-14 day follow up office visit: no    Scheduled appointment with PCP within 7-14 days, pt did not need to see PCP, has a follow up with cardiology on 24, she was advised if she needs anything before she sees cardiology to call office.    Follow Up  Future Appointments   Date Time Provider Department Center   2024  9:45 AM Hilaria Kingston MD Morris Joshua Ivory MA

## 2024-04-08 NOTE — TELEPHONE ENCOUNTER
From: Kaylie Lopez  To: Dr. Hilaria Kingston  Sent: 4/6/2024 2:56 PM EDT  Subject: Procedure follow up question     I’ve had some lightheadedness following the procedure/ discharge. I imagine it’s somewhat normal given the anesthesia, and some bleeding at sheath removal, but I didn’t know how long I should expect it to last.

## 2024-04-08 NOTE — TELEPHONE ENCOUNTER
I spoke with the patient and she stated that she has a quarter sized lump in her groin area where the catheter was inserted. Patient describes hematoma post operatively. I advised her that this can be expected. She denies pain or discomfort. Patient educated on s/s of infection and when to contact the office. She V/U.

## 2024-05-07 ASSESSMENT — ENCOUNTER SYMPTOMS
SHORTNESS OF BREATH: 0
WHEEZING: 0
LEFT EYE: 0
HEMATEMESIS: 0
RIGHT EYE: 0
STRIDOR: 0
HEMATOCHEZIA: 0
SNORING: 1

## 2024-05-07 NOTE — PROGRESS NOTES
Assessment:     1. Frequent PVC's: patient presented with occasional palpitations over a few months. Wore a Holter monitor (2 days) which showed a 21% burden of unifocal PVC's.     PVC morphology is unifocal with a left bundle branch block pattern and a left inferior axis. Precordial transition is between V3 and V4 possibly indicating a likely right outflow tract origin. A PVC burden above 10-15% can, at times, cause cardiac abnormalities such as PVC-mediated cardiomyopathy. This patient has normal overall cardiac structure and function by echocardiography. She has no personal history of syncope or near-syncope. No family history of sudden cardiac death.     Did not have much in terms of palpitations or related symptoms. Was started on propranolol and felt better in retrospect. We continued beta-blocker and did a more extended event monitor (one week) to assess residual burden of PVC's. It showed 18% PVC burden. After that, we started her on verapamil 120 mg daily which has caused her lightheadedness. She dod not wish to continue verapamil.     After discussions with patient, we proceeded with EP study on 4/4/2024 with finding, and successful ablation, of RVOT PVC focus. There were no procedural complications. No PVC's on ECG today. Patient has not felt any further palpitations (can feel the difference) and detects no irregular rhythm when checking her pulse.     Meanwhile, she was referred for sleep study to assess for LAST as it can cause different types of cardiac arrhythmias.     2. Good blood pressure control    3. Suspected obstructive sleep apnea: still would recommend evaluation by sleep medicine to avoid future arrhythmia issues.     4. Regular exercise and weight loss strongly recommended.     Plan:     Proceed with sleep apnea testing.   No activity restrictions. Encourage moderate exercise.   Follow up with me as needed.     Subjective:     Patient ID: Kaylie Lopez is a 38 y.o. female.    Chief

## 2024-05-08 ENCOUNTER — OFFICE VISIT (OUTPATIENT)
Dept: CARDIOLOGY CLINIC | Age: 39
End: 2024-05-08
Payer: COMMERCIAL

## 2024-05-08 VITALS
HEIGHT: 63 IN | DIASTOLIC BLOOD PRESSURE: 78 MMHG | WEIGHT: 201.6 LBS | BODY MASS INDEX: 35.72 KG/M2 | SYSTOLIC BLOOD PRESSURE: 120 MMHG | HEART RATE: 90 BPM | OXYGEN SATURATION: 100 %

## 2024-05-08 DIAGNOSIS — I49.3 FREQUENT PVCS: ICD-10-CM

## 2024-05-08 DIAGNOSIS — R29.818 SUSPECTED SLEEP APNEA: ICD-10-CM

## 2024-05-08 DIAGNOSIS — E66.9 OBESITY (BMI 30.0-34.9): ICD-10-CM

## 2024-05-08 DIAGNOSIS — R00.2 PALPITATIONS: Primary | ICD-10-CM

## 2024-05-08 PROCEDURE — 99214 OFFICE O/P EST MOD 30 MIN: CPT | Performed by: INTERNAL MEDICINE

## 2024-05-08 PROCEDURE — 93000 ELECTROCARDIOGRAM COMPLETE: CPT | Performed by: INTERNAL MEDICINE

## 2024-05-08 NOTE — PATIENT INSTRUCTIONS
Plan:     Proceed with sleep apnea testing.   No activity restrictions. Encourage moderate exercise.   Follow up with me as needed.

## 2024-10-23 ENCOUNTER — TELEPHONE (OUTPATIENT)
Age: 39
End: 2024-10-23

## 2024-10-23 NOTE — TELEPHONE ENCOUNTER
Pt states that she has an appt with Dr. Sierra on 4-7-25 @ 11:30 AM.    I do not see that.  Please call to discuss.     Message wrong chart disregard

## (undated) DEVICE — PACK PROCEDURE SURG GYN LAP CDS

## (undated) DEVICE — Z DISCONTINUED PER MEDLINE TRAY SKIN PREP DRY W/ PREM GLV APPLICATORS GZ TWL OVERWRAP

## (undated) DEVICE — SUTURE VCRL + SZ 4-0 L18IN ABSRB UD L19MM PS-2 3/8 CIR PRIM VCP496H

## (undated) DEVICE — TISSUE RETRIEVAL SYSTEM: Brand: INZII RETRIEVAL SYSTEM

## (undated) DEVICE — DEVICE MANIP L330MM DIA4.5MM UTER DISP INJ ZINNANTI KRONNER

## (undated) DEVICE — TROCAR ENDOSCP L100MM DIA12MM BLDELSS OBT RADLUC STBL SL

## (undated) DEVICE — NEEDLE HYPO 25GA L1.5IN BLU POLYPR HUB S STL REG BVL STR

## (undated) DEVICE — PUMP SUC IRR TBNG L10FT W/ HNDPC ASSEMB STRYKEFLOW 2

## (undated) DEVICE — DISCONTINUED USE 111569 BF APPLICATOR COTN TIP 6IN ST

## (undated) DEVICE — SUTURE SZ 0 27IN 5/8 CIR UR-6  TAPER PT VIOLET ABSRB VICRYL J603H

## (undated) DEVICE — TROCAR ENDOSCP L100MM DIA5MM BLDELSS STBL SL OBT RADLUC

## (undated) DEVICE — 35 ML SYRINGE LUER-LOCK TIP: Brand: MONOJECT

## (undated) DEVICE — SUTURE MCRYL + SZ 4-0 L18IN ABSRB UD L19MM PS-2 3/8 CIR MCP496G

## (undated) DEVICE — SEALER LAP L37CM MARYLAND JAW OPN NANO COAT MULTIFUNCTIONAL

## (undated) DEVICE — TIP SCIS L5MM DBL ACT CRV DISP METZ

## (undated) DEVICE — GLOVE SURG SZ 65 THK91MIL LTX FREE SYN POLYISOPRENE

## (undated) DEVICE — Device

## (undated) DEVICE — SYRINGE MED 10ML POLYPR GEN PURP FLAT TOP LUERLOCK TIP

## (undated) DEVICE — KIT SURG PREP POVIDONE IOD WET FOR UNIV USE SPNG STK

## (undated) DEVICE — TROCAR ENDOSCP L100MM DIA5MM DIL TIP STBL SL W OPTVW

## (undated) DEVICE — SKIN AFFIX SURG ADHESIVE 72/CS 0.55ML: Brand: MEDLINE

## (undated) DEVICE — GLOVE ORANGE PI 7 1/2   MSG9075

## (undated) DEVICE — SUTURE VCRL + SZ 0 L27IN ABSRB VLT L26MM UR-6 5/8 CIR VCP603H

## (undated) DEVICE — PAD TBL OP RM TRENDELENBURG STATIC TORSO W/STRAPS